# Patient Record
Sex: FEMALE | Race: WHITE | NOT HISPANIC OR LATINO | Employment: OTHER | URBAN - METROPOLITAN AREA
[De-identification: names, ages, dates, MRNs, and addresses within clinical notes are randomized per-mention and may not be internally consistent; named-entity substitution may affect disease eponyms.]

---

## 2017-02-08 ENCOUNTER — OFFICE VISIT - HEALTHEAST (OUTPATIENT)
Dept: FAMILY MEDICINE | Facility: CLINIC | Age: 57
End: 2017-02-08

## 2017-02-08 DIAGNOSIS — Z85.048 HISTORY OF RECTAL OR ANAL CANCER: ICD-10-CM

## 2017-02-08 DIAGNOSIS — E78.5 HYPERLIPIDEMIA: ICD-10-CM

## 2017-02-08 DIAGNOSIS — I10 ESSENTIAL HYPERTENSION: ICD-10-CM

## 2017-02-08 LAB
CHOLEST SERPL-MCNC: 219 MG/DL
FASTING STATUS PATIENT QL REPORTED: YES
HDLC SERPL-MCNC: 41 MG/DL
LDLC SERPL CALC-MCNC: 143 MG/DL
TRIGL SERPL-MCNC: 175 MG/DL

## 2017-02-08 ASSESSMENT — MIFFLIN-ST. JEOR: SCORE: 1858.72

## 2017-02-09 ENCOUNTER — COMMUNICATION - HEALTHEAST (OUTPATIENT)
Dept: FAMILY MEDICINE | Facility: CLINIC | Age: 57
End: 2017-02-09

## 2017-08-10 ENCOUNTER — OFFICE VISIT - HEALTHEAST (OUTPATIENT)
Dept: FAMILY MEDICINE | Facility: CLINIC | Age: 57
End: 2017-08-10

## 2017-08-10 DIAGNOSIS — B35.1 FUNGAL NAIL INFECTION: ICD-10-CM

## 2017-08-10 DIAGNOSIS — E78.5 HYPERLIPIDEMIA: ICD-10-CM

## 2017-08-10 DIAGNOSIS — I10 ESSENTIAL HYPERTENSION: ICD-10-CM

## 2017-08-10 DIAGNOSIS — Z23 IMMUNIZATION DUE: ICD-10-CM

## 2017-08-10 DIAGNOSIS — Z12.4 SCREENING FOR CERVICAL CANCER: ICD-10-CM

## 2017-08-10 LAB
CHOLEST SERPL-MCNC: 202 MG/DL
FASTING STATUS PATIENT QL REPORTED: YES
HDLC SERPL-MCNC: 40 MG/DL
LDLC SERPL CALC-MCNC: 133 MG/DL
TRIGL SERPL-MCNC: 146 MG/DL

## 2017-08-10 ASSESSMENT — MIFFLIN-ST. JEOR: SCORE: 1796.08

## 2017-08-11 ENCOUNTER — COMMUNICATION - HEALTHEAST (OUTPATIENT)
Dept: FAMILY MEDICINE | Facility: CLINIC | Age: 57
End: 2017-08-11

## 2017-08-15 LAB
HPV INTERPRETATION - HISTORICAL: NORMAL
HPV INTERPRETER - HISTORICAL: NORMAL

## 2017-09-13 ENCOUNTER — HOSPITAL ENCOUNTER (OUTPATIENT)
Dept: MAMMOGRAPHY | Facility: CLINIC | Age: 57
Discharge: HOME | End: 2017-09-13
Attending: FAMILY MEDICINE

## 2017-09-13 DIAGNOSIS — Z12.31 VISIT FOR SCREENING MAMMOGRAM: ICD-10-CM

## 2017-09-14 ENCOUNTER — COMMUNICATION - HEALTHEAST (OUTPATIENT)
Dept: FAMILY MEDICINE | Facility: CLINIC | Age: 57
End: 2017-09-14

## 2018-02-14 ENCOUNTER — OFFICE VISIT - HEALTHEAST (OUTPATIENT)
Dept: FAMILY MEDICINE | Facility: CLINIC | Age: 58
End: 2018-02-14

## 2018-02-14 DIAGNOSIS — I10 ESSENTIAL HYPERTENSION: ICD-10-CM

## 2018-02-14 DIAGNOSIS — I10 HYPERTENSION: ICD-10-CM

## 2018-02-14 DIAGNOSIS — Z13.1 SCREENING FOR DIABETES MELLITUS: ICD-10-CM

## 2018-02-14 DIAGNOSIS — E78.5 HYPERLIPIDEMIA: ICD-10-CM

## 2018-02-14 LAB
CHOLEST SERPL-MCNC: 217 MG/DL
FASTING STATUS PATIENT QL REPORTED: YES
HDLC SERPL-MCNC: 45 MG/DL
LDLC SERPL CALC-MCNC: 142 MG/DL
TRIGL SERPL-MCNC: 150 MG/DL

## 2018-02-14 ASSESSMENT — MIFFLIN-ST. JEOR: SCORE: 1854.48

## 2018-02-15 LAB — HBA1C MFR BLD: 5.7 % (ref 4.2–6.1)

## 2018-02-16 ENCOUNTER — COMMUNICATION - HEALTHEAST (OUTPATIENT)
Dept: FAMILY MEDICINE | Facility: CLINIC | Age: 58
End: 2018-02-16

## 2018-03-15 ENCOUNTER — OFFICE VISIT - HEALTHEAST (OUTPATIENT)
Dept: FAMILY MEDICINE | Facility: CLINIC | Age: 58
End: 2018-03-15

## 2018-03-15 DIAGNOSIS — I10 ESSENTIAL HYPERTENSION: ICD-10-CM

## 2018-03-15 ASSESSMENT — MIFFLIN-ST. JEOR: SCORE: 1855.05

## 2018-03-22 ENCOUNTER — COMMUNICATION - HEALTHEAST (OUTPATIENT)
Dept: FAMILY MEDICINE | Facility: CLINIC | Age: 58
End: 2018-03-22

## 2018-03-22 DIAGNOSIS — I10 ESSENTIAL HYPERTENSION: ICD-10-CM

## 2018-03-26 ENCOUNTER — COMMUNICATION - HEALTHEAST (OUTPATIENT)
Dept: FAMILY MEDICINE | Facility: CLINIC | Age: 58
End: 2018-03-26

## 2018-03-26 DIAGNOSIS — I10 ESSENTIAL HYPERTENSION: ICD-10-CM

## 2018-05-31 ENCOUNTER — OFFICE VISIT - HEALTHEAST (OUTPATIENT)
Dept: FAMILY MEDICINE | Facility: CLINIC | Age: 58
End: 2018-05-31

## 2018-05-31 ENCOUNTER — COMMUNICATION - HEALTHEAST (OUTPATIENT)
Dept: FAMILY MEDICINE | Facility: CLINIC | Age: 58
End: 2018-05-31

## 2018-05-31 DIAGNOSIS — I10 HYPERTENSION: ICD-10-CM

## 2018-05-31 DIAGNOSIS — E78.5 HYPERLIPIDEMIA: ICD-10-CM

## 2018-05-31 LAB
ALBUMIN SERPL-MCNC: 3.7 G/DL (ref 3.5–5)
ALP SERPL-CCNC: 88 U/L (ref 45–120)
ALT SERPL W P-5'-P-CCNC: 12 U/L (ref 0–45)
ANION GAP SERPL CALCULATED.3IONS-SCNC: 10 MMOL/L (ref 5–18)
AST SERPL W P-5'-P-CCNC: 17 U/L (ref 0–40)
BILIRUB SERPL-MCNC: 0.6 MG/DL (ref 0–1)
BUN SERPL-MCNC: 16 MG/DL (ref 8–22)
CALCIUM SERPL-MCNC: 9.5 MG/DL (ref 8.5–10.5)
CHLORIDE BLD-SCNC: 108 MMOL/L (ref 98–107)
CHOLEST SERPL-MCNC: 199 MG/DL
CO2 SERPL-SCNC: 23 MMOL/L (ref 22–31)
CREAT SERPL-MCNC: 0.83 MG/DL (ref 0.6–1.1)
FASTING STATUS PATIENT QL REPORTED: YES
GFR SERPL CREATININE-BSD FRML MDRD: >60 ML/MIN/1.73M2
GLUCOSE BLD-MCNC: 89 MG/DL (ref 70–125)
HDLC SERPL-MCNC: 42 MG/DL
LDLC SERPL CALC-MCNC: 127 MG/DL
POTASSIUM BLD-SCNC: 4.5 MMOL/L (ref 3.5–5)
PROT SERPL-MCNC: 7 G/DL (ref 6–8)
SODIUM SERPL-SCNC: 141 MMOL/L (ref 136–145)
TRIGL SERPL-MCNC: 151 MG/DL

## 2018-05-31 ASSESSMENT — MIFFLIN-ST. JEOR: SCORE: 1827.26

## 2018-09-24 ENCOUNTER — COMMUNICATION - HEALTHEAST (OUTPATIENT)
Dept: FAMILY MEDICINE | Facility: CLINIC | Age: 58
End: 2018-09-24

## 2018-09-24 DIAGNOSIS — I10 ESSENTIAL HYPERTENSION: ICD-10-CM

## 2018-09-25 ENCOUNTER — HOSPITAL ENCOUNTER (OUTPATIENT)
Dept: MAMMOGRAPHY | Facility: CLINIC | Age: 58
Discharge: HOME | End: 2018-09-25
Attending: FAMILY MEDICINE

## 2018-09-25 DIAGNOSIS — Z12.31 VISIT FOR SCREENING MAMMOGRAM: ICD-10-CM

## 2019-04-15 ENCOUNTER — COMMUNICATION - HEALTHEAST (OUTPATIENT)
Dept: FAMILY MEDICINE | Facility: CLINIC | Age: 59
End: 2019-04-15

## 2019-05-14 ENCOUNTER — COMMUNICATION - HEALTHEAST (OUTPATIENT)
Dept: FAMILY MEDICINE | Facility: CLINIC | Age: 59
End: 2019-05-14

## 2019-05-14 ENCOUNTER — OFFICE VISIT - HEALTHEAST (OUTPATIENT)
Dept: FAMILY MEDICINE | Facility: CLINIC | Age: 59
End: 2019-05-14

## 2019-05-14 DIAGNOSIS — Z23 IMMUNIZATION DUE: ICD-10-CM

## 2019-05-14 DIAGNOSIS — R45.89 FEELING OF SADNESS: ICD-10-CM

## 2019-05-14 DIAGNOSIS — E78.5 HYPERLIPIDEMIA: ICD-10-CM

## 2019-05-14 DIAGNOSIS — I10 HYPERTENSION: ICD-10-CM

## 2019-05-14 LAB
ALBUMIN SERPL-MCNC: 3.7 G/DL (ref 3.5–5)
ALP SERPL-CCNC: 94 U/L (ref 45–120)
ALT SERPL W P-5'-P-CCNC: 16 U/L (ref 0–45)
ANION GAP SERPL CALCULATED.3IONS-SCNC: 13 MMOL/L (ref 5–18)
AST SERPL W P-5'-P-CCNC: 18 U/L (ref 0–40)
BILIRUB SERPL-MCNC: 0.4 MG/DL (ref 0–1)
BUN SERPL-MCNC: 17 MG/DL (ref 8–22)
CALCIUM SERPL-MCNC: 9.2 MG/DL (ref 8.5–10.5)
CHLORIDE BLD-SCNC: 107 MMOL/L (ref 98–107)
CHOLEST SERPL-MCNC: 209 MG/DL
CO2 SERPL-SCNC: 21 MMOL/L (ref 22–31)
CREAT SERPL-MCNC: 0.86 MG/DL (ref 0.6–1.1)
FASTING STATUS PATIENT QL REPORTED: YES
GFR SERPL CREATININE-BSD FRML MDRD: >60 ML/MIN/1.73M2
GLUCOSE BLD-MCNC: 87 MG/DL (ref 70–125)
HDLC SERPL-MCNC: 43 MG/DL
LDLC SERPL CALC-MCNC: 131 MG/DL
POTASSIUM BLD-SCNC: 4.2 MMOL/L (ref 3.5–5)
PROT SERPL-MCNC: 7 G/DL (ref 6–8)
SODIUM SERPL-SCNC: 141 MMOL/L (ref 136–145)
TRIGL SERPL-MCNC: 174 MG/DL

## 2019-05-14 ASSESSMENT — MIFFLIN-ST. JEOR: SCORE: 1894.74

## 2019-10-03 ENCOUNTER — OFFICE VISIT - HEALTHEAST (OUTPATIENT)
Dept: FAMILY MEDICINE | Facility: CLINIC | Age: 59
End: 2019-10-03

## 2019-10-03 DIAGNOSIS — I10 ESSENTIAL HYPERTENSION: ICD-10-CM

## 2019-10-03 DIAGNOSIS — E78.2 MIXED HYPERLIPIDEMIA: ICD-10-CM

## 2019-10-03 DIAGNOSIS — Z23 NEED FOR INFLUENZA VACCINATION: ICD-10-CM

## 2019-10-03 LAB
CHOLEST SERPL-MCNC: 195 MG/DL
FASTING STATUS PATIENT QL REPORTED: YES
HDLC SERPL-MCNC: 44 MG/DL
LDLC SERPL CALC-MCNC: 121 MG/DL
TRIGL SERPL-MCNC: 148 MG/DL

## 2019-10-03 ASSESSMENT — MIFFLIN-ST. JEOR: SCORE: 1889.92

## 2019-10-04 ENCOUNTER — COMMUNICATION - HEALTHEAST (OUTPATIENT)
Dept: FAMILY MEDICINE | Facility: CLINIC | Age: 59
End: 2019-10-04

## 2019-10-07 ENCOUNTER — HOSPITAL ENCOUNTER (OUTPATIENT)
Dept: MAMMOGRAPHY | Facility: CLINIC | Age: 59
Discharge: HOME | End: 2019-10-07
Attending: FAMILY MEDICINE

## 2019-10-07 DIAGNOSIS — Z12.31 VISIT FOR SCREENING MAMMOGRAM: ICD-10-CM

## 2020-09-11 ENCOUNTER — COMMUNICATION - HEALTHEAST (OUTPATIENT)
Dept: FAMILY MEDICINE | Facility: CLINIC | Age: 60
End: 2020-09-11

## 2020-09-11 DIAGNOSIS — I10 ESSENTIAL HYPERTENSION: ICD-10-CM

## 2020-10-12 ENCOUNTER — HOSPITAL ENCOUNTER (OUTPATIENT)
Dept: MAMMOGRAPHY | Facility: CLINIC | Age: 60
Discharge: HOME | End: 2020-10-12
Attending: FAMILY MEDICINE

## 2020-10-12 DIAGNOSIS — Z12.31 VISIT FOR SCREENING MAMMOGRAM: ICD-10-CM

## 2020-10-13 ENCOUNTER — COMMUNICATION - HEALTHEAST (OUTPATIENT)
Dept: FAMILY MEDICINE | Facility: CLINIC | Age: 60
End: 2020-10-13

## 2020-12-11 ENCOUNTER — COMMUNICATION - HEALTHEAST (OUTPATIENT)
Dept: FAMILY MEDICINE | Facility: CLINIC | Age: 60
End: 2020-12-11

## 2020-12-11 DIAGNOSIS — I10 ESSENTIAL HYPERTENSION: ICD-10-CM

## 2020-12-14 RX ORDER — LISINOPRIL 2.5 MG/1
TABLET ORAL
Qty: 180 TABLET | Refills: 0 | Status: SHIPPED | OUTPATIENT
Start: 2020-12-14 | End: 2021-08-12

## 2021-03-16 ENCOUNTER — OFFICE VISIT - HEALTHEAST (OUTPATIENT)
Dept: FAMILY MEDICINE | Facility: CLINIC | Age: 61
End: 2021-03-16

## 2021-03-16 DIAGNOSIS — Z13.220 SCREENING FOR LIPID DISORDERS: ICD-10-CM

## 2021-03-16 DIAGNOSIS — E66.01 MORBID OBESITY (H): ICD-10-CM

## 2021-03-16 DIAGNOSIS — I10 ESSENTIAL HYPERTENSION: ICD-10-CM

## 2021-03-16 DIAGNOSIS — Z13.1 SCREENING FOR DIABETES MELLITUS: ICD-10-CM

## 2021-03-16 LAB
ALBUMIN SERPL-MCNC: 3.7 G/DL (ref 3.5–5)
ALP SERPL-CCNC: 95 U/L (ref 45–120)
ALT SERPL W P-5'-P-CCNC: 18 U/L (ref 0–45)
ANION GAP SERPL CALCULATED.3IONS-SCNC: 9 MMOL/L (ref 5–18)
AST SERPL W P-5'-P-CCNC: 23 U/L (ref 0–40)
BILIRUB SERPL-MCNC: 0.5 MG/DL (ref 0–1)
BUN SERPL-MCNC: 15 MG/DL (ref 8–22)
CALCIUM SERPL-MCNC: 8.6 MG/DL (ref 8.5–10.5)
CHLORIDE BLD-SCNC: 108 MMOL/L (ref 98–107)
CHOLEST SERPL-MCNC: 202 MG/DL
CO2 SERPL-SCNC: 26 MMOL/L (ref 22–31)
CREAT SERPL-MCNC: 0.87 MG/DL (ref 0.6–1.1)
FASTING STATUS PATIENT QL REPORTED: YES
GFR SERPL CREATININE-BSD FRML MDRD: >60 ML/MIN/1.73M2
GLUCOSE BLD-MCNC: 97 MG/DL (ref 70–125)
HBA1C MFR BLD: 5.5 %
HDLC SERPL-MCNC: 46 MG/DL
LDLC SERPL CALC-MCNC: 128 MG/DL
POTASSIUM BLD-SCNC: 4.3 MMOL/L (ref 3.5–5)
PROT SERPL-MCNC: 6.9 G/DL (ref 6–8)
SODIUM SERPL-SCNC: 143 MMOL/L (ref 136–145)
TRIGL SERPL-MCNC: 141 MG/DL

## 2021-03-16 RX ORDER — LISINOPRIL 10 MG/1
10 TABLET ORAL DAILY
Qty: 90 TABLET | Refills: 1 | Status: SHIPPED | OUTPATIENT
Start: 2021-03-16 | End: 2021-08-12

## 2021-03-16 ASSESSMENT — MIFFLIN-ST. JEOR: SCORE: 1927.06

## 2021-03-19 ENCOUNTER — COMMUNICATION - HEALTHEAST (OUTPATIENT)
Dept: FAMILY MEDICINE | Facility: CLINIC | Age: 61
End: 2021-03-19

## 2021-05-24 ENCOUNTER — RECORDS - HEALTHEAST (OUTPATIENT)
Dept: ADMINISTRATIVE | Facility: CLINIC | Age: 61
End: 2021-05-24

## 2021-05-25 ENCOUNTER — RECORDS - HEALTHEAST (OUTPATIENT)
Dept: ADMINISTRATIVE | Facility: CLINIC | Age: 61
End: 2021-05-25

## 2021-05-26 ENCOUNTER — RECORDS - HEALTHEAST (OUTPATIENT)
Dept: ADMINISTRATIVE | Facility: CLINIC | Age: 61
End: 2021-05-26

## 2021-05-27 ENCOUNTER — RECORDS - HEALTHEAST (OUTPATIENT)
Dept: ADMINISTRATIVE | Facility: CLINIC | Age: 61
End: 2021-05-27

## 2021-05-28 ENCOUNTER — RECORDS - HEALTHEAST (OUTPATIENT)
Dept: ADMINISTRATIVE | Facility: CLINIC | Age: 61
End: 2021-05-28

## 2021-05-28 NOTE — PROGRESS NOTES
ASSESMENT AND PLAN:  Diagnoses and all orders for this visit:    Hypertension  -     Comprehensive Metabolic Panel  -     lisinopril (PRINIVIL,ZESTRIL) 5 MG tablet; TAKE 1 TABLET BY MOUTH DAILY ALONG WITH 10 MG PILL FOR TOTAL DAILY DOSE OF 15 MG  Dispense: 90 tablet; Refill: 2  -     lisinopril (PRINIVIL,ZESTRIL) 10 MG tablet; Take 1 tablet (10 mg total) by mouth daily.  Dispense: 90 tablet; Refill: 2  Still not at target. Pt does not want to increase current med dose. Will try to cut back salt, more regular physical activities.  Recheck in a few weeks.    Hyperlipidemia  -     Lipid Profile  Patient does not want to be on meds.  Last lipid 1 year ago.  To recheck today.  Discussed low-cholesterol diet and regular physical activities.    Feeling of sadness  Offered referral for counseling, pt declined.     Immunization due  -     Pneumococcal conjugate vaccine 13-valent 6wks-17yrs; >50yrs        SUBJECTIVE: Tanisha ENGEL Leonora is here for follow up.    HTN- On lisinopril 15 mg daily. No SE reported. States home BP reading are in 120s-130s/70-80s. Forgot to bring BP log/home BP cuff. No physical complaints. But brother passed away in 9/18. Reports family conflicts after brother's death. Feeling sad and overwhelmed.  No suicidal or homicidal ideations.     Lipid- Last lipid in 5/18. Not on med. Does not want to be on med. Trying to eat healthy but hast not been able to exercise.         No past medical history on file.  Patient Active Problem List   Diagnosis     Tick bite of back     Screening for lipid disorders     Elevated blood pressure     Screening for diabetes mellitus     Hypertension     History of rectal or anal cancer       Allergies:  No Known Allergies    Social History     Tobacco Use   Smoking Status Never Smoker   Smokeless Tobacco Never Used       Review of systems otherwise negative except as listed in HPI.   Social History     Tobacco Use   Smoking Status Never Smoker   Smokeless Tobacco Never Used  "      OBJECTICE: /88   Pulse 84   Temp 97.6  F (36.4  C) (Oral)   Resp 20   Ht 5' 7\" (1.702 m)   Wt (!) 287 lb 1 oz (130.2 kg)   BMI 44.96 kg/m      DATA REVIEWED:    Labs Reviewed or Ordered (1):       GEN-alert,  in no apparent distress.  HEENT-mucous membranes are moist, neck is supple.  CV-regular rate and rhythm with no murmur.   RESP-lungs clear to auscultation .  ABDOMEN- Soft , not tender.  EXTREM- No edema.  NEURO- Normal.   SKIN-normal        Bryce Za   5/14/2019   "

## 2021-05-29 ENCOUNTER — RECORDS - HEALTHEAST (OUTPATIENT)
Dept: ADMINISTRATIVE | Facility: CLINIC | Age: 61
End: 2021-05-29

## 2021-05-30 ENCOUNTER — RECORDS - HEALTHEAST (OUTPATIENT)
Dept: ADMINISTRATIVE | Facility: CLINIC | Age: 61
End: 2021-05-30

## 2021-05-30 VITALS — BODY MASS INDEX: 43.67 KG/M2 | WEIGHT: 278.25 LBS | HEIGHT: 67 IN

## 2021-05-31 VITALS — HEIGHT: 67 IN | BODY MASS INDEX: 41.64 KG/M2 | WEIGHT: 265.31 LBS

## 2021-06-01 VITALS — BODY MASS INDEX: 43.68 KG/M2 | WEIGHT: 278.31 LBS | HEIGHT: 67 IN

## 2021-06-01 VITALS — WEIGHT: 272.19 LBS | HEIGHT: 67 IN | BODY MASS INDEX: 42.72 KG/M2

## 2021-06-01 VITALS — HEIGHT: 67 IN | WEIGHT: 278.19 LBS | BODY MASS INDEX: 43.66 KG/M2

## 2021-06-02 NOTE — PROGRESS NOTES
"ASSESMENT AND PLAN:  Diagnoses and all orders for this visit:    Mixed hyperlipidemia  -     Lipid Cascade FASTING  Start medication if indicated.    Essential hypertension  -     lisinopril (PRINIVIL,ZESTRIL) 5 MG tablet; TAKE 1 TABLET BY MOUTH DAILY ALONG WITH 10 MG PILL FOR TOTAL DAILY DOSE OF 15 MG  Dispense: 90 tablet; Refill: 2  -     lisinopril (PRINIVIL,ZESTRIL) 10 MG tablet; Take 1 tablet (10 mg total) by mouth daily.  Dispense: 90 tablet; Refill: 2  She will continue lisinopril 15 mg daily.    Need for influenza vaccination  -     Influenza, Seasonal Quad, PF =/> 6months      This transcription uses voice recognition software, which may contain typographical errors.        SUBJECTIVE: Tanisha Lea is a 58-year-old female with history of hypertension here for follow-up.  She is currently on lisinopril 15 mg daily.  She checks her blood pressure at home to 3 times a week.  States it has been in the 120s to low 70s.  Denied acute headache.  Denied acute vision changes or weaknesses.    Her cholesterol was slightly elevated, but it is not taking any medication.      No new concerns or complaints since last visit.    No past medical history on file.  Patient Active Problem List   Diagnosis     Tick bite of back     Screening for lipid disorders     Elevated blood pressure     Screening for diabetes mellitus     Hypertension     History of rectal or anal cancer     Hyperlipidemia       Allergies:  No Known Allergies    Social History     Tobacco Use   Smoking Status Never Smoker   Smokeless Tobacco Never Used       Review of systems otherwise negative except as listed in HPI.   Social History     Tobacco Use   Smoking Status Never Smoker   Smokeless Tobacco Never Used       OBJECTICE: /88 (Patient Site: Left Arm, Patient Position: Sitting, Cuff Size: Adult Large)   Pulse (!) 53   Temp 97.8  F (36.6  C) (Oral)   Ht 5' 7\" (1.702 m)   Wt (!) 286 lb (129.7 kg)   SpO2 92%   BMI 44.79 kg/m      DATA " REVIEWED:    Labs Reviewed or Ordered (1):       GEN-alert,  in no apparent distress.  HEENT- neck is supple.  CV-regular rate and rhythm with no murmur.   RESP-lungs clear to auscultation .  ABDOMEN- Soft , not tender.  EXTREM- No edema.   SKIN-normal        Ramón Kruger   10/3/2019

## 2021-06-03 VITALS — BODY MASS INDEX: 45.06 KG/M2 | WEIGHT: 287.06 LBS | HEIGHT: 67 IN

## 2021-06-03 VITALS
DIASTOLIC BLOOD PRESSURE: 88 MMHG | HEART RATE: 53 BPM | TEMPERATURE: 97.8 F | BODY MASS INDEX: 44.89 KG/M2 | OXYGEN SATURATION: 92 % | SYSTOLIC BLOOD PRESSURE: 122 MMHG | HEIGHT: 67 IN | WEIGHT: 286 LBS

## 2021-06-05 VITALS
HEART RATE: 82 BPM | WEIGHT: 293 LBS | DIASTOLIC BLOOD PRESSURE: 84 MMHG | RESPIRATION RATE: 20 BRPM | OXYGEN SATURATION: 95 % | TEMPERATURE: 98 F | BODY MASS INDEX: 45.99 KG/M2 | SYSTOLIC BLOOD PRESSURE: 132 MMHG | HEIGHT: 67 IN

## 2021-06-08 NOTE — PROGRESS NOTES
"ASSESMENT AND PLAN:  Diagnoses and all orders for this visit:    Essential hypertension  -     Basic Metabolic Panel  -     lisinopril (PRINIVIL,ZESTRIL) 10 MG tablet; Take 1 tablet (10 mg total) by mouth daily.  Dispense: 30 tablet; Refill: 5  Pt will check BP at home daily for the next 2 weeks. She will call if consistently above 140/90.  Will increase lisinopril dose if needed.    Hyperlipidemia  -     Lipid Cascade  Start med if needed.    History of rectal or anal cancer  Pt brought in last colonoscopy report done in 2014.  1 mm polyps in transverse colon. Recommended repeat colonoscopy in 5 years. Due in 2019.    SUBJECTIVE: Tanisha Lea is here for HTN follow up.  Take lisinopril 10 mg daily, no SE reported.  Checks BP once or twice a week, 110s-120s/70s . Never above 130/90 at home. No Chest pain, acute vision changes or headaches.  No new complaints today.   Has been trying to eat healthy and exercise regularly.       No past medical history on file.  Patient Active Problem List   Diagnosis     Tick bite of back     Screening for lipid disorders     Elevated blood pressure     Screening for diabetes mellitus     Hypertension     History of rectal or anal cancer       Allergies:  No Known Allergies    History   Smoking Status     Never Smoker   Smokeless Tobacco     Never Used       Review of systems otherwise negative except as listed in HPI.   History   Smoking Status     Never Smoker   Smokeless Tobacco     Never Used       OBJECTICE:   Visit Vitals     /88     Pulse 87     Temp 98  F (36.7  C) (Oral)     Resp 20     Ht 5' 7.25\" (1.708 m)     Wt (!) 278 lb 4 oz (126.2 kg)     SpO2 96%     BMI 43.26 kg/m2           GEN-alert,  in no apparent distress.  HEENT-mucous membranes are moist, neck is supple.  CV-regular rate and rhythm with no murmur.   RESP-lungs clear to auscultation .  ABDOMEN- Soft , not tender.  EXTREM- No edema.  SKIN-normal        [facility]   2/8/2017     "

## 2021-06-11 NOTE — TELEPHONE ENCOUNTER
One refill sent. Due for office visit. Please call the patient.    Dr. Ramón Kruger  9/15/2020 7:49 AM

## 2021-06-11 NOTE — TELEPHONE ENCOUNTER
RN cannot approve Refill Request    RN can NOT refill this medication PCP messaged that patient is overdue for Labs. Last office visit: 10/3/2019 Ramón Kruger MD Last Physical: Visit date not found Last MTM visit: Visit date not found Last visit same specialty: 10/3/2019 Ramón Kruger MD.  Next visit within 3 mo: Visit date not found  Next physical within 3 mo: Visit date not found      Robina Kraft, [facility] Triage/Med Refill 9/12/2020    Requested Prescriptions   Pending Prescriptions Disp Refills     lisinopriL (PRINIVIL,ZESTRIL) 10 MG tablet [Pharmacy Med Name: LISINOPRIL 10MG TABLETS] 90 tablet 2     Sig: TAKE 1 TABLET(10 MG) BY MOUTH DAILY       Ace Inhibitors Refill Protocol Failed - 9/11/2020  2:49 PM        Failed - Serum Potassium in past 12 months     No results found for: LN-POTASSIUM          Failed - Serum Creatinine in past 12 months     Creatinine   Date Value Ref Range Status   05/14/2019 0.86 0.60 - 1.10 mg/dL Final             Passed - PCP or prescribing provider visit in past 12 months       Last office visit with prescriber/PCP: 10/3/2019 Ramón Kruger MD OR same dept: 10/3/2019 Ramón Kruger MD OR same specialty: 10/3/2019 Ramón Kruger MD  Last physical: Visit date not found Last MTM visit: Visit date not found   Next visit within 3 mo: Visit date not found  Next physical within 3 mo: Visit date not found  Prescriber OR PCP: Ramón Kruger MD  Last diagnosis associated with med order: 1. Essential hypertension  - lisinopriL (PRINIVIL,ZESTRIL) 10 MG tablet [Pharmacy Med Name: LISINOPRIL 10MG TABLETS]; TAKE 1 TABLET(10 MG) BY MOUTH DAILY  Dispense: 90 tablet; Refill: 2  - lisinopriL (PRINIVIL,ZESTRIL) 5 MG tablet [Pharmacy Med Name: LISINOPRIL 5MG TABLETS]; TAKE 1 TABLET BY MOUTH DAILY ALONG WITH 10MG TABLET FOR A TOTAL DAILY DOSE OF 15MG.  Dispense: 90 tablet; Refill: 2    If protocol passes may refill for 12 months if within 3 months of last provider visit (or a total of 15 months).             Passed  - Blood pressure filed in past 12 months     BP Readings from Last 1 Encounters:   10/03/19 122/88                lisinopriL (PRINIVIL,ZESTRIL) 5 MG tablet [Pharmacy Med Name: LISINOPRIL 5MG TABLETS] 90 tablet 2     Sig: TAKE 1 TABLET BY MOUTH DAILY ALONG WITH 10MG TABLET FOR A TOTAL DAILY DOSE OF 15MG.       Ace Inhibitors Refill Protocol Failed - 9/11/2020  2:49 PM        Failed - Serum Potassium in past 12 months     No results found for: LN-POTASSIUM          Failed - Serum Creatinine in past 12 months     Creatinine   Date Value Ref Range Status   05/14/2019 0.86 0.60 - 1.10 mg/dL Final             Passed - PCP or prescribing provider visit in past 12 months       Last office visit with prescriber/PCP: 10/3/2019 Ramón Kruger MD OR same dept: 10/3/2019 Ramón Kruger MD OR same specialty: 10/3/2019 Ramón Kruger MD  Last physical: Visit date not found Last MTM visit: Visit date not found   Next visit within 3 mo: Visit date not found  Next physical within 3 mo: Visit date not found  Prescriber OR PCP: Ramón Kruger MD  Last diagnosis associated with med order: 1. Essential hypertension  - lisinopriL (PRINIVIL,ZESTRIL) 10 MG tablet [Pharmacy Med Name: LISINOPRIL 10MG TABLETS]; TAKE 1 TABLET(10 MG) BY MOUTH DAILY  Dispense: 90 tablet; Refill: 2  - lisinopriL (PRINIVIL,ZESTRIL) 5 MG tablet [Pharmacy Med Name: LISINOPRIL 5MG TABLETS]; TAKE 1 TABLET BY MOUTH DAILY ALONG WITH 10MG TABLET FOR A TOTAL DAILY DOSE OF 15MG.  Dispense: 90 tablet; Refill: 2    If protocol passes may refill for 12 months if within 3 months of last provider visit (or a total of 15 months).             Passed - Blood pressure filed in past 12 months     BP Readings from Last 1 Encounters:   10/03/19 122/88

## 2021-06-12 NOTE — PROGRESS NOTES
ASSESMENT AND PLAN:  Diagnoses and all orders for this visit:    Essential hypertension  -     Basic Metabolic Panel  -     lisinopril (PRINIVIL,ZESTRIL) 10 MG tablet; Take 1 tablet (10 mg total) by mouth daily.  Dispense: 30 tablet; Refill: 5  Continue current medications, follow-up in 6 months.    Hyperlipidemia  -     Lipid Cascade  Reviewed previous lipid results.  Discussed medication if worsen, patient agreed.    Screening for cervical cancer  -     Gynecologic Cytology (PAP Smear)    Fungal nail infection  Discussed options including oral, topical medications.  Discussed liver side effects from oral medication.  Discussed possible long duration of treatment.  Patient elected to try OTC topical medications first. Deferred confirmation test at this time.     Immunization due  -     Tdap vaccine,  6yo or older,  IM        SUBJECTIVE: Tanisha MAREN Lea is here to f/u on HTN and pap smear.  On lisinopril 10 mg daily for HTN, home blood pressure ranges 110-120s/80s .  No medication side effects reported.    Last Pap was more than 10 years ago.  Sexually active.  No known history of abnormal Pap in the past. No vaginal bleeding or discharge.    Elevated lipids, has been trying diet and exercise, needs recheck today.    Concern about fungus infection on the left big toe, started a few months ago.  No pain.    No cute fever or URI symptoms.  No CP, SOB or weakness.   No headache.    No past medical history on file.  Patient Active Problem List   Diagnosis     Tick bite of back     Screening for lipid disorders     Elevated blood pressure     Screening for diabetes mellitus     Hypertension     History of rectal or anal cancer       Allergies:  No Known Allergies    History   Smoking Status     Never Smoker   Smokeless Tobacco     Never Used       Review of systems otherwise negative except as listed in HPI.   History   Smoking Status     Never Smoker   Smokeless Tobacco     Never Used       OBJECTICE: /84 (Patient  "Site: Left Arm, Patient Position: Sitting, Cuff Size: Adult Large)  Pulse 84  Temp 97.9  F (36.6  C) (Oral)   Ht 5' 7\" (1.702 m)  Wt (!) 265 lb 5 oz (120.3 kg)  BMI 41.55 kg/m2    DATA REVIEWED:    Labs Reviewed or Ordered (1):       GEN-alert,  in no apparent distress.  HEENT-mucous membranes are moist, neck is supple.  CV-regular rate and rhythm with no murmur.   RESP-lungs clear to auscultation .  ABDOMEN- Soft , not tender.  PELVIC-normal external genitalia.  Vaginal wall appears normal.  Cervix normal.  Thin white discharge noted.  No cervical motion tenderness.  SKIN-normal        Ramón Kruger   8/10/2017     "

## 2021-06-13 NOTE — TELEPHONE ENCOUNTER
3 months refill sent. Due for office visit. Please call the patient.    Dr. Ramón Kruger  12/14/2020 1:54 PM

## 2021-06-13 NOTE — TELEPHONE ENCOUNTER
RN cannot approve Refill Request    RN can NOT refill this medication Protocol failed and NO refill given. Last office visit: 10/3/2019 Ramón Kruger MD Last Physical: Visit date not found Last MTM visit: Visit date not found Last visit same specialty: 10/3/2019 Ramón Kruger MD.  Next visit within 3 mo: Visit date not found  Next physical within 3 mo: Visit date not found      Robina Kraft, [facility] Triage/Med Refill 12/12/2020    Requested Prescriptions   Pending Prescriptions Disp Refills     lisinopriL (PRINIVIL,ZESTRIL) 2.5 MG tablet [Pharmacy Med Name: LISINOPRIL 2.5MG TABLETS] 180 tablet 0     Sig: TAKE 2 TABLETS(5MG) BY MOUTH DAILY ALONG WITH 10MG TABLET FOR A TOTAL DAILY DOSE OF 15MG.       Ace Inhibitors Refill Protocol Failed - 12/11/2020  6:29 PM        Failed - PCP or prescribing provider visit in past 12 months       Last office visit with prescriber/PCP: 10/3/2019 Ramón Kruger MD OR same dept: Visit date not found OR same specialty: 10/3/2019 Ramón Kruger MD  Last physical: Visit date not found Last MTM visit: Visit date not found   Next visit within 3 mo: Visit date not found  Next physical within 3 mo: Visit date not found  Prescriber OR PCP: Ramón Kruger MD  Last diagnosis associated with med order: 1. Essential hypertension  - lisinopriL (PRINIVIL,ZESTRIL) 2.5 MG tablet [Pharmacy Med Name: LISINOPRIL 2.5MG TABLETS]; TAKE 2 TABLETS(5MG) BY MOUTH DAILY ALONG WITH 10MG TABLET FOR A TOTAL DAILY DOSE OF 15MG.  Dispense: 180 tablet; Refill: 0  - lisinopriL (PRINIVIL,ZESTRIL) 10 MG tablet [Pharmacy Med Name: LISINOPRIL 10MG TABLETS]; TAKE 1 TABLET(10 MG) BY MOUTH DAILY  Dispense: 90 tablet; Refill: 0    If protocol passes may refill for 12 months if within 3 months of last provider visit (or a total of 15 months).             Failed - Serum Potassium in past 12 months     No results found for: LN-POTASSIUM          Failed - Blood pressure filed in past 12 months     BP Readings from Last 1 Encounters:    10/03/19 122/88             Failed - Serum Creatinine in past 12 months     Creatinine   Date Value Ref Range Status   05/14/2019 0.86 0.60 - 1.10 mg/dL Final                lisinopriL (PRINIVIL,ZESTRIL) 10 MG tablet [Pharmacy Med Name: LISINOPRIL 10MG TABLETS] 90 tablet 0     Sig: TAKE 1 TABLET(10 MG) BY MOUTH DAILY       Ace Inhibitors Refill Protocol Failed - 12/11/2020  6:29 PM        Failed - PCP or prescribing provider visit in past 12 months       Last office visit with prescriber/PCP: 10/3/2019 Ramón Kruger MD OR same dept: Visit date not found OR same specialty: 10/3/2019 Ramón Kruger MD  Last physical: Visit date not found Last MTM visit: Visit date not found   Next visit within 3 mo: Visit date not found  Next physical within 3 mo: Visit date not found  Prescriber OR PCP: Ramón Kruger MD  Last diagnosis associated with med order: 1. Essential hypertension  - lisinopriL (PRINIVIL,ZESTRIL) 2.5 MG tablet [Pharmacy Med Name: LISINOPRIL 2.5MG TABLETS]; TAKE 2 TABLETS(5MG) BY MOUTH DAILY ALONG WITH 10MG TABLET FOR A TOTAL DAILY DOSE OF 15MG.  Dispense: 180 tablet; Refill: 0  - lisinopriL (PRINIVIL,ZESTRIL) 10 MG tablet [Pharmacy Med Name: LISINOPRIL 10MG TABLETS]; TAKE 1 TABLET(10 MG) BY MOUTH DAILY  Dispense: 90 tablet; Refill: 0    If protocol passes may refill for 12 months if within 3 months of last provider visit (or a total of 15 months).             Failed - Serum Potassium in past 12 months     No results found for: LN-POTASSIUM          Failed - Blood pressure filed in past 12 months     BP Readings from Last 1 Encounters:   10/03/19 122/88             Failed - Serum Creatinine in past 12 months     Creatinine   Date Value Ref Range Status   05/14/2019 0.86 0.60 - 1.10 mg/dL Final

## 2021-06-15 NOTE — PROGRESS NOTES
ASSESMENT AND PLAN:  Diagnoses and all orders for this visit:    Essential hypertension  -     lisinopriL (PRINIVIL,ZESTRIL) 10 MG tablet; Take 1 tablet (10 mg total) by mouth daily.  Dispense: 90 tablet; Refill: 1  -     Comprehensive Metabolic Panel  She will continue to monitor her blood pressure at home.  She will let me know if consistently above 140/90.  We will continue lisinopril 10 mg for now.    Morbid obesity (H)  The following high BMI interventions were performed this visit: encouragement to exercise   Wt Readings from Last 3 Encounters:   03/16/21 (!) 294 lb 3 oz (133.4 kg)   10/03/19 (!) 286 lb (129.7 kg)   05/14/19 (!) 287 lb 1 oz (130.2 kg)     Screening for diabetes mellitus  -     Glycosylated Hemoglobin A1c    Screening for lipid disorders  -     Lipid Cascade RANDOM      This transcription uses voice recognition software, which may contain typographical errors.      SUBJECTIVE: Tanisha Lea is a 60-year-old female here to follow-up on hypertension.  She was last seen in October 2019.  Lisinopril was increased to 15 mg daily at that visit.  She monitor her blood pressure at home regularly.  She decreased her lisinopril to 12.5 mg daily in January due to low blood pressure reading diastolic in the 50s.  She continued to monitor blood pressure and cut her lisinopril back to 10 mg the beginning of this month.  His home blood pressure readings are in the low normal range mostly 110-100 systolic and 60s to 70s diastolic with lisinopril 10 mg.  She has no physical complaints.  But she does not like 1 her diastolic pressure is in the 50s.  She has not been able to exercise regularly.  She is planning on starting to exercise soon.    No past medical history on file.  Patient Active Problem List   Diagnosis     Tick bite of back     Screening for lipid disorders     Elevated blood pressure     Screening for diabetes mellitus     Hypertension     History of rectal or anal cancer     Hyperlipidemia      "Morbid obesity (H)       Allergies:  No Known Allergies    Social History     Tobacco Use   Smoking Status Never Smoker   Smokeless Tobacco Never Used       Review of systems otherwise negative except as listed in HPI.   Social History     Tobacco Use   Smoking Status Never Smoker   Smokeless Tobacco Never Used       OBJECTICE: /84   Pulse 82   Temp 98  F (36.7  C) (Oral)   Resp 20   Ht 5' 7\" (1.702 m)   Wt (!) 294 lb 3 oz (133.4 kg)   SpO2 95%   BMI 46.08 kg/m      DATA REVIEWED:    Labs Reviewed or Ordered (1):       GEN-alert,  in no apparent distress.  HEENT- neck is supple.  CV-regular rate and rhythm with no murmur.   RESP-lungs clear to auscultation .  EXTREM- No edema.  SKIN-normal        [place] Saskia   3/16/2021   "

## 2021-06-16 PROBLEM — E78.5 HYPERLIPIDEMIA: Status: ACTIVE | Noted: 2019-05-14

## 2021-06-16 PROBLEM — E66.01 MORBID OBESITY (H): Status: ACTIVE | Noted: 2021-03-16

## 2021-06-16 NOTE — PROGRESS NOTES
"ASSESMENT AND PLAN:  Diagnoses and all orders for this visit:    Hypertension  Uncontrolled.  Recheck blood pressure 148/82.  Reported normal home blood pressure reading, she did not bring her log today.  Discussed to increase lisinopril to a higher dose.  Patient would like to stay on current dose 10 mg daily and recheck in 1 month.  Will increase dose if indicated.    Screening for diabetes mellitus  -     Glycosylated Hemoglobin A1c    Hyperlipidemia  -     Lipid Cascade  Not on med.  , recheck. Start med if indicated.    Essential hypertension  -     lisinopril (PRINIVIL,ZESTRIL) 10 MG tablet; Take 1 tablet (10 mg total) by mouth daily.  Dispense: 30 tablet; Refill: 0        SUBJECTIVE: Tanisha Lea is here to follow-up on hypertension.  She takes lisinopril 10 mg daily.  No medication side effects reported.  She was last seen next months ago.  She has been checking her blood pressure at home and the numbers are 120s-130s/70.  No new symptoms since last visit.  No acute headaches, acute vision changes or one-sided weakness.  She cooks at home most days.  Tries to cut back on salt intake.  She has not been able to exercise regularly.  She gained 12 pounds since last visit.    No known family history of history of diabetes.  Brother has hypertension, on medication.    No past medical history on file.  Patient Active Problem List   Diagnosis     Tick bite of back     Screening for lipid disorders     Elevated blood pressure     Screening for diabetes mellitus     Hypertension     History of rectal or anal cancer       Allergies:  No Known Allergies    History   Smoking Status     Never Smoker   Smokeless Tobacco     Never Used       Review of systems otherwise negative except as listed in HPI.   History   Smoking Status     Never Smoker   Smokeless Tobacco     Never Used       OBJECTICE: /82  Pulse 88  Temp 97.8  F (36.6  C) (Oral)   Resp 20  Ht 5' 7\" (1.702 m)  Wt (!) 278 lb 3 oz (126.2 kg)  BMI " 43.57 kg/m2    DATA REVIEWED:    Labs Reviewed or Ordered (1): Lipid, A1c      GEN-alert,  in no apparent distress.  HEENT- neck is supple.  CV-regular rate and rhythm with no murmur.   RESP-lungs clear to auscultation .  ABDOMEN- Soft , not tender.  EXTREM- No edema.  SKIN-normal        [place] Saskia   2/14/2018

## 2021-06-16 NOTE — PROGRESS NOTES
"ASSESMENT AND PLAN:  Diagnoses and all orders for this visit:    Essential hypertension  -     lisinopril (PRINIVIL,ZESTRIL) 5 MG tablet; Take one tab daily along with 10 mg pill  , total 15 mg daily  Dispense: 30 tablet; Refill: 5  Rechecked blood pressure 3 times.  142/84  with manual,152/81 with home BP cuff.    150/90 with manual, 156/78 with home BP cuff    Will increase lisinopril to 15 mg daily.  Recheck blood pressure in 6 weeks.      SUBJECTIVE: Tanisha Lea is here for blood pressure check.  She is currently taking lisinopril 10 mg daily for her blood pressure.  She was seen 2 weeks ago and blood pressure was elevated, no med changes were made.  Today she states she is feeling good, no new problems or complaints since last visit.  No acute headache.  No acute vision changes.  No chest pain or shortness of breath.  She brought in her home blood pressure cuff today.    No past medical history on file.  Patient Active Problem List   Diagnosis     Tick bite of back     Screening for lipid disorders     Elevated blood pressure     Screening for diabetes mellitus     Hypertension     History of rectal or anal cancer       Allergies:  No Known Allergies    History   Smoking Status     Never Smoker   Smokeless Tobacco     Never Used       Review of systems otherwise negative except as listed in HPI.   History   Smoking Status     Never Smoker   Smokeless Tobacco     Never Used       OBJECTICE: /82  Pulse 76  Temp 97.6  F (36.4  C) (Oral)   Resp 20  Ht 5' 7\" (1.702 m)  Wt (!) 278 lb 5 oz (126.2 kg)  BMI 43.59 kg/m2          GEN-alert,  in no apparent distress.  HEENT- neck is supple.  CV-regular rate and rhythm with no murmur.   RESP-lungs clear to auscultation .    This transcription uses voice recognition software, which may contain typographical errors.          Ramón Kruger   3/15/2018     "

## 2021-06-18 NOTE — PROGRESS NOTES
"ASSESMENT AND PLAN:  Diagnoses and all orders for this visit:    Hypertension  -     Comprehensive Metabolic Panel  Nick better now, controlled.  Continue current med.   F/U in 6 months.       Hyperlipidemia  -     Lipid Cascade  Start med if indicated. Pt preferred life style changes first.   Last LDL was 142      SUBJECTIVE: Tanisha Lea is here for BP check.  Lisinopril was increased to 15 mg daily 2 months ago. Tolearting well, no SE reported.  She checks BP at home, log shows 100-110/70-80 daily.  No new problems since last visit.     No CP, shortness of breath, palpitations.  No fever or URI symptoms.     Helping brother who was diagnosed with brain cancer.       No past medical history on file.  Patient Active Problem List   Diagnosis     Tick bite of back     Screening for lipid disorders     Elevated blood pressure     Screening for diabetes mellitus     Hypertension     History of rectal or anal cancer       Allergies:  No Known Allergies    History   Smoking Status     Never Smoker   Smokeless Tobacco     Never Used       Review of systems otherwise negative except as listed in HPI.   History   Smoking Status     Never Smoker   Smokeless Tobacco     Never Used       OBJECTICE:   Vitals:    05/31/18 0917   BP: 138/88   Patient Site: Right Arm   Patient Position: Sitting   Cuff Size: Adult Large   Pulse: 72   Resp: 20   Temp: 97.8  F (36.6  C)   TempSrc: Oral   Weight: (!) 272 lb 3 oz (123.5 kg)   Height: 5' 7\" (1.702 m)     DATA REVIEWED:    Labs Reviewed or Ordered (1):       GEN-alert,  in no apparent distress.  HEENT-mucous membranes are moist, neck is supple.  CV-regular rate and rhythm with no murmur.   RESP-lungs clear to auscultation .  ABDOMEN- Soft , not tender.  EXTREM- No edema.  NEURO- Grossly normal.  SKIN-normal        Ramón Kruger   5/31/2018     "

## 2021-06-19 NOTE — LETTER
Letter by Ramón Kruger MD at      Author: Ramón Kruger MD Service: -- Author Type: --    Filed:  Encounter Date: 10/4/2019 Status: Signed         Tanisha MAREN Leonora  [address] [facility] [address]             October 4, 2019         Dear Ms. Lea,    Below are the results from your recent visit:    Resulted Orders   Lipid Hood FASTING   Result Value Ref Range    Cholesterol 195 <=199 mg/dL    Triglycerides 148 <=149 mg/dL    HDL Cholesterol 44 (L) >=50 mg/dL    LDL Calculated 121 <=129 mg/dL    Patient Fasting > 8hrs? Yes        Cholesterol is better than last year. Keep up the good work!    Please call with questions or contact us using [email or web address].    Sincerely,        Electronically signed by Ramón Kruger MD

## 2021-06-19 NOTE — LETTER
Letter by Ramón Kruger MD at      Author: Ramón Kruger MD Service: -- Author Type: --    Filed:  Encounter Date: 5/14/2019 Status: (Other)         Tanisha Lea  [address] [facility] [address]             May 14, 2019         Dear Ms. Lea,    Below are the results from your recent visit:    Resulted Orders   Lipid Profile   Result Value Ref Range    Triglycerides 174 (H) <=149 mg/dL    Cholesterol 209 (H) <=199 mg/dL    LDL Calculated 131 (H) <=129 mg/dL    HDL Cholesterol 43 (L) >=50 mg/dL    Patient Fasting > 8hrs? Yes    Comprehensive Metabolic Panel   Result Value Ref Range    Sodium 141 136 - 145 mmol/L    Potassium 4.2 3.5 - 5.0 mmol/L    Chloride 107 98 - 107 mmol/L    CO2 21 (L) 22 - 31 mmol/L    Anion Gap, Calculation 13 5 - 18 mmol/L    Glucose 87 70 - 125 mg/dL    BUN 17 8 - 22 mg/dL    Creatinine 0.86 0.60 - 1.10 mg/dL    GFR MDRD Af Amer >60 >60 mL/min/1.73m2    GFR MDRD Non Af Amer >60 >60 mL/min/1.73m2    Bilirubin, Total 0.4 0.0 - 1.0 mg/dL    Calcium 9.2 8.5 - 10.5 mg/dL    Protein, Total 7.0 6.0 - 8.0 g/dL    Albumin 3.7 3.5 - 5.0 g/dL    Alkaline Phosphatase 94 45 - 120 U/L    AST 18 0 - 40 U/L    ALT 16 0 - 45 U/L    Narrative    Fasting Glucose reference range is 70-99 mg/dL per  American Diabetes Association (ADA) guidelines.       Your cholesterol looks slightly higher than last year.  Keep on working on diet and exercise, we should recheck in 6 months.  Your kidney and liver tests are normal.    Please call with questions or contact us using [email or web address].    Sincerely,        Electronically signed by Ramón Kruger MD

## 2021-06-20 NOTE — LETTER
Letter by Ramón Kruger MD at      Author: Ramón Kruger MD Service: -- Author Type: --    Filed:  Encounter Date: 10/13/2020 Status: (Other)         Tanisha Lea  [address] [facility] [address]             October 13, 2020         Dear Ms. Lea,    Below are the results from your recent visit:    Resulted Orders   Mammo Screening Bilateral    Narrative    BILATERAL FULL FIELD DIGITAL SCREENING MAMMOGRAM WITH TOMOSYNTHESIS    Performed on: 10/12/20.      Compared to: 10/07/2019 Mammo Screening Bilateral, 09/25/2018 Mammo   Screening Bilateral, 09/13/2017 Mammo Screening Bilateral, 08/09/2016   Mammo Screening Bilateral, 04/30/2015 Mammo Screening Bilateral,   04/29/2014 Mammo Screening Bilateral, 04/25/2013 Mammo Screening   Bilateral, 04/23/2012 Mammo Screening Bilateral, and 04/20/2011 Mammo   Screening Bilateral    Findings: The breasts have scattered areas of fibroglandular densities.   There is no radiographic evidence of malignancy. This study was evaluated   with the assistance of Computer-Aided Detection. Breast Tomosynthesis was   used in interpretation. Routine screening mammogram in one year is   recommended.    ACR BI-RADS Category 1: Negative       Your mammogram is normal.    Please call with questions or contact us using [email or web address].    Sincerely,        Electronically signed by Ramón Kruger MD

## 2021-06-21 NOTE — LETTER
Letter by Ramón Kruger MD at      Author: Ramón Kruger MD Service: -- Author Type: --    Filed:  Encounter Date: 3/19/2021 Status: (Other)         Tanisha Lea  [address] [facility] [address]             March 19, 2021         Dear Ms. Lea,    Below are the results from your recent visit:    Resulted Orders   Comprehensive Metabolic Panel   Result Value Ref Range    Sodium 143 136 - 145 mmol/L    Potassium 4.3 3.5 - 5.0 mmol/L    Chloride 108 (H) 98 - 107 mmol/L    CO2 26 22 - 31 mmol/L    Anion Gap, Calculation 9 5 - 18 mmol/L    Glucose 97 70 - 125 mg/dL    BUN 15 8 - 22 mg/dL    Creatinine 0.87 0.60 - 1.10 mg/dL    GFR MDRD Af Amer >60 >60 mL/min/1.73m2    GFR MDRD Non Af Amer >60 >60 mL/min/1.73m2    Bilirubin, Total 0.5 0.0 - 1.0 mg/dL    Calcium 8.6 8.5 - 10.5 mg/dL    Protein, Total 6.9 6.0 - 8.0 g/dL    Albumin 3.7 3.5 - 5.0 g/dL    Alkaline Phosphatase 95 45 - 120 U/L    AST 23 0 - 40 U/L    ALT 18 0 - 45 U/L    Narrative    Fasting Glucose reference range is 70-99 mg/dL per  American Diabetes Association (ADA) guidelines.   Lipid Cascade RANDOM   Result Value Ref Range    Cholesterol 202 (H) <=199 mg/dL    Triglycerides 141 <=149 mg/dL    HDL Cholesterol 46 (L) >=50 mg/dL    LDL Calculated 128 <=129 mg/dL    Patient Fasting > 8hrs? Yes    Glycosylated Hemoglobin A1c   Result Value Ref Range    Hemoglobin A1c 5.5 <=5.6 %       Labs look good.       Please let me know if you have any questions.     Sincerely,        Electronically signed by Ramón Kruger MD

## 2021-07-02 ENCOUNTER — AMBULATORY - HEALTHEAST (OUTPATIENT)
Dept: FAMILY MEDICINE | Facility: CLINIC | Age: 61
End: 2021-07-02

## 2021-07-02 DIAGNOSIS — E03.9 HYPOTHYROIDISM, UNSPECIFIED TYPE: ICD-10-CM

## 2021-07-02 RX ORDER — LEVOTHYROXINE SODIUM 50 UG/1
50 TABLET ORAL DAILY
Qty: 30 TABLET | Refills: 5 | Status: SHIPPED | OUTPATIENT
Start: 2021-07-02 | End: 2021-11-11

## 2021-07-13 ENCOUNTER — RECORDS - HEALTHEAST (OUTPATIENT)
Dept: ADMINISTRATIVE | Facility: CLINIC | Age: 61
End: 2021-07-13

## 2021-07-21 ENCOUNTER — RECORDS - HEALTHEAST (OUTPATIENT)
Dept: ADMINISTRATIVE | Facility: CLINIC | Age: 61
End: 2021-07-21

## 2021-08-06 NOTE — PROGRESS NOTES
SUBJECTIVE:   CC: Tanisha Lea is an 60 year old woman who presents for preventive health visit.     Her for physical.   Hair loss is improving after starting thyroid med, synthroid 50 mcg daily started on 7/2/21.   Monitoring BP at home, always < 140/90. No acute headache, no weakness.         Healthy Habits:     Getting at least 3 servings of Calcium per day:  Yes    Bi-annual eye exam:  Yes    Dental care twice a year:  Yes    Sleep apnea or symptoms of sleep apnea:  Excessive snoring    Diet:  Regular (no restrictions)    Frequency of exercise:  2-3 days/week    Duration of exercise:  15-30 minutes    Taking medications regularly:  Yes    Medication side effects:  None    PHQ-2 Total Score: 0    Additional concerns today:  No        Today's PHQ-2 Score:   PHQ-2 ( 1999 Pfizer) 8/6/2021   Q1: Little interest or pleasure in doing things 0   Q2: Feeling down, depressed or hopeless 0   PHQ-2 Score 0   Q1: Little interest or pleasure in doing things Not at all   Q2: Feeling down, depressed or hopeless Not at all   PHQ-2 Score 0       Abuse: Current or Past (Physical, Sexual or Emotional) - No  Do you feel safe in your environment? Yes    Have you ever done Advance Care Planning? (For example, a Health Directive, POLST, or a discussion with a medical provider or your loved ones about your wishes): Yes, patient states has an Advance Care Planning document and will bring a copy to the clinic.    Social History     Tobacco Use     Smoking status: Never Smoker     Smokeless tobacco: Never Used   Substance Use Topics     Alcohol use: Never     If you drink alcohol do you typically have >3 drinks per day or >7 drinks per week? No    No flowsheet data found.      Breast Cancer Screening:  Any new diagnosis of family breast, ovarian, or bowel cancer? No  Brother was diagnosis with Cancer brain       click delete button to remove this line now    Pertinent mammograms are reviewed under the imaging tab.    History of abnormal  "Pap smear: NO - age 30-65 PAP every 5 years with negative HPV co-testing recommended  PAP / HPV 8/10/2017   PAP Negative for squamous intraepithelial lesion or malignancy  Electronically signed by Susie Weeks CT (ASCP) on 8/18/2017 at 10:00 AM       Reviewed and updated as needed this visit by clinical staff  Tobacco  Allergies       Soc Hx        Reviewed and updated as needed this visit by Provider                    Review of Systems  CONSTITUTIONAL: NEGATIVE for fever, chills, change in weight  INTEGUMENTARY/SKIN: NEGATIVE for worrisome rashes, moles or lesions  EYES: NEGATIVE for vision changes or irritation  ENT: NEGATIVE for ear, mouth and throat problems  RESP: NEGATIVE for significant cough or SOB  BREAST: NEGATIVE for masses, tenderness or discharge  CV: NEGATIVE for chest pain, palpitations or peripheral edema  GI: NEGATIVE for nausea, abdominal pain, heartburn, or change in bowel habits  : NEGATIVE for unusual urinary or vaginal symptoms. No vaginal bleeding.  MUSCULOSKELETAL: NEGATIVE for significant arthralgias or myalgia  NEURO: NEGATIVE for weakness, dizziness or paresthesias  PSYCHIATRIC: NEGATIVE for changes in mood or affect      OBJECTIVE:   BP (!) 164/88 (BP Location: Left arm, Patient Position: Sitting, Cuff Size: Adult Large)   Pulse 97   Temp 98.4  F (36.9  C) (Oral)   Resp 16   Ht 1.702 m (5' 7\")   Wt 126.2 kg (278 lb 5 oz)   SpO2 (!) 88%   BMI 43.59 kg/m    Physical Exam  GENERAL: healthy, alert and no distress  RESP: lungs clear to auscultation - no rales, rhonchi or wheezes  BREAST: Declined. Deferred.   CV: regular rate and rhythm, normal S1 S2, no S3 or S4, no murmur, click or rub, no peripheral edema and peripheral pulses strong  ABDOMEN: soft, nontender, no hepatosplenomegaly, no masses and bowel sounds normal   (female): deferred  MS: no gross musculoskeletal defects noted, no edema        ASSESSMENT/PLAN:     Annual physical exam  No pap due   Due for " "colonoscopy, last in 2016. Polyp   She will call and schedule for mammogram.    Essential hypertension  Uncontrolled. Increased lisinopril from 10 mg to 15 mg.  She will continue to monitor BP at home. Will let me know if > 140/90  - lisinopril (ZESTRIL) 10 MG tablet; Take 1 tablet (10 mg) by mouth daily  - Extra Tube; Future  - Extra Tube  - lisinopril (ZESTRIL) 5 MG tablet; Take one tab daily with lisinopril 10 mg daily ( total 15 mg ).    Hypothyroidism, unspecified type  Recheck today, dose adjustment if needed.  - TSH with free T4 reflex    H/O colonoscopy with polypectomy  Due for colonoscopy, order placed.     Hyperlipidemia, unspecified hyperlipidemia type  Recheck at next visit.     Morbid obesity (H)    COUNSELING:  Reviewed preventive health counseling, as reflected in patient instructions       Regular exercise    Estimated body mass index is 43.59 kg/m  as calculated from the following:    Height as of this encounter: 1.702 m (5' 7\").    Weight as of this encounter: 126.2 kg (278 lb 5 oz).        She reports that she has never smoked. She has never used smokeless tobacco.      Counseling Resources:  ATP IV Guidelines  Pooled Cohorts Equation Calculator  Breast Cancer Risk Calculator  BRCA-Related Cancer Risk Assessment: FHS-7 Tool  FRAX Risk Assessment  ICSI Preventive Guidelines  Dietary Guidelines for Americans, 2010  USDA's MyPlate  ASA Prophylaxis  Lung CA Screening    Ramón Kruger MD  [facility]  "

## 2021-08-12 ENCOUNTER — OFFICE VISIT (OUTPATIENT)
Dept: FAMILY MEDICINE | Facility: CLINIC | Age: 61
End: 2021-08-12
Payer: COMMERCIAL

## 2021-08-12 VITALS
TEMPERATURE: 98.4 F | SYSTOLIC BLOOD PRESSURE: 152 MMHG | DIASTOLIC BLOOD PRESSURE: 88 MMHG | BODY MASS INDEX: 43.68 KG/M2 | HEIGHT: 67 IN | WEIGHT: 278.31 LBS | RESPIRATION RATE: 16 BRPM | OXYGEN SATURATION: 88 % | HEART RATE: 97 BPM

## 2021-08-12 DIAGNOSIS — Z86.0100 H/O COLONOSCOPY WITH POLYPECTOMY: ICD-10-CM

## 2021-08-12 DIAGNOSIS — E78.5 HYPERLIPIDEMIA, UNSPECIFIED HYPERLIPIDEMIA TYPE: ICD-10-CM

## 2021-08-12 DIAGNOSIS — I10 ESSENTIAL HYPERTENSION: ICD-10-CM

## 2021-08-12 DIAGNOSIS — Z98.890 H/O COLONOSCOPY WITH POLYPECTOMY: ICD-10-CM

## 2021-08-12 DIAGNOSIS — Z00.00 ANNUAL PHYSICAL EXAM: Primary | ICD-10-CM

## 2021-08-12 DIAGNOSIS — E03.9 HYPOTHYROIDISM, UNSPECIFIED TYPE: ICD-10-CM

## 2021-08-12 DIAGNOSIS — E66.01 MORBID OBESITY (H): ICD-10-CM

## 2021-08-12 LAB
HOLD SPECIMEN: NORMAL
TSH SERPL DL<=0.005 MIU/L-ACNC: 0.56 UIU/ML (ref 0.3–5)

## 2021-08-12 PROCEDURE — 84443 ASSAY THYROID STIM HORMONE: CPT | Performed by: FAMILY MEDICINE

## 2021-08-12 PROCEDURE — 99396 PREV VISIT EST AGE 40-64: CPT | Performed by: FAMILY MEDICINE

## 2021-08-12 PROCEDURE — 36415 COLL VENOUS BLD VENIPUNCTURE: CPT | Performed by: FAMILY MEDICINE

## 2021-08-12 RX ORDER — LISINOPRIL 10 MG/1
10 TABLET ORAL DAILY
Qty: 90 TABLET | Refills: 1 | Status: SHIPPED | OUTPATIENT
Start: 2021-08-12 | End: 2021-11-11

## 2021-08-12 RX ORDER — LISINOPRIL 5 MG/1
TABLET ORAL
Qty: 90 TABLET | Refills: 1 | Status: SHIPPED | OUTPATIENT
Start: 2021-08-12 | End: 2021-11-11

## 2021-08-12 ASSESSMENT — MIFFLIN-ST. JEOR: SCORE: 1855.05

## 2021-08-18 ENCOUNTER — TRANSFERRED RECORDS (OUTPATIENT)
Dept: HEALTH INFORMATION MANAGEMENT | Facility: CLINIC | Age: 61
End: 2021-08-18

## 2021-09-30 ENCOUNTER — TRANSFERRED RECORDS (OUTPATIENT)
Dept: HEALTH INFORMATION MANAGEMENT | Facility: CLINIC | Age: 61
End: 2021-09-30

## 2021-10-13 ENCOUNTER — ANCILLARY PROCEDURE (OUTPATIENT)
Dept: MAMMOGRAPHY | Facility: CLINIC | Age: 61
End: 2021-10-13
Attending: FAMILY MEDICINE
Payer: COMMERCIAL

## 2021-10-13 DIAGNOSIS — Z12.31 VISIT FOR SCREENING MAMMOGRAM: ICD-10-CM

## 2021-10-13 PROCEDURE — 77067 SCR MAMMO BI INCL CAD: CPT | Mod: TC | Performed by: INTERNAL MEDICINE

## 2021-10-13 PROCEDURE — 77063 BREAST TOMOSYNTHESIS BI: CPT | Mod: TC | Performed by: INTERNAL MEDICINE

## 2021-11-11 ENCOUNTER — OFFICE VISIT (OUTPATIENT)
Dept: FAMILY MEDICINE | Facility: CLINIC | Age: 61
End: 2021-11-11
Payer: COMMERCIAL

## 2021-11-11 VITALS
RESPIRATION RATE: 20 BRPM | WEIGHT: 284.56 LBS | OXYGEN SATURATION: 91 % | BODY MASS INDEX: 44.66 KG/M2 | SYSTOLIC BLOOD PRESSURE: 138 MMHG | TEMPERATURE: 98.1 F | DIASTOLIC BLOOD PRESSURE: 78 MMHG | HEIGHT: 67 IN | HEART RATE: 94 BPM

## 2021-11-11 DIAGNOSIS — Z23 HIGH PRIORITY FOR 2019-NCOV VACCINE: ICD-10-CM

## 2021-11-11 DIAGNOSIS — I10 ESSENTIAL HYPERTENSION: Primary | ICD-10-CM

## 2021-11-11 DIAGNOSIS — E66.01 MORBID OBESITY (H): ICD-10-CM

## 2021-11-11 DIAGNOSIS — E03.9 HYPOTHYROIDISM, UNSPECIFIED TYPE: ICD-10-CM

## 2021-11-11 PROCEDURE — 0004A COVID-19,PF,PFIZER (12+ YRS): CPT | Performed by: FAMILY MEDICINE

## 2021-11-11 PROCEDURE — 91300 COVID-19,PF,PFIZER (12+ YRS): CPT | Performed by: FAMILY MEDICINE

## 2021-11-11 PROCEDURE — 99213 OFFICE O/P EST LOW 20 MIN: CPT | Mod: 25 | Performed by: FAMILY MEDICINE

## 2021-11-11 RX ORDER — LISINOPRIL 5 MG/1
TABLET ORAL
Qty: 90 TABLET | Refills: 2 | Status: SHIPPED | OUTPATIENT
Start: 2021-11-11 | End: 2022-02-04

## 2021-11-11 RX ORDER — LEVOTHYROXINE SODIUM 50 UG/1
50 TABLET ORAL DAILY
Qty: 90 TABLET | Refills: 2 | Status: SHIPPED | OUTPATIENT
Start: 2021-11-11 | End: 2022-08-05

## 2021-11-11 RX ORDER — LISINOPRIL 10 MG/1
10 TABLET ORAL DAILY
Qty: 90 TABLET | Refills: 2 | Status: SHIPPED | OUTPATIENT
Start: 2021-11-11 | End: 2022-02-04

## 2021-11-11 ASSESSMENT — MIFFLIN-ST. JEOR: SCORE: 1878.4

## 2021-11-11 NOTE — PROGRESS NOTES
"ASSESMENT AND PLAN:  Essential hypertension  Controlled .   - lisinopril (ZESTRIL) 10 MG tablet; Take 1 tablet (10 mg) by mouth daily  - lisinopril (ZESTRIL) 5 MG tablet; Take one tab daily with lisinopril 10 mg daily ( total 15 mg ).    Hypothyroidism, unspecified type  - levothyroxine (SYNTHROID/LEVOTHROID) 50 MCG tablet; Take 1 tablet (50 mcg) by mouth daily  Normal TSH in 8/21.  Continue current dose.    Morbid obesity (H)  The following high BMI interventions were performed this visit: encouragement to exercise    High priority for 2019-nCoV vaccine  - COVID-19,PF,PFIZER (12+ Yrs PURPLE LABEL)      SUBJECTIVE: Tanisha Lea is here to follow up on HTN.  Monitoring BP at home . 110-120s/70- 80 range.   Currently on lisinopril 15 mg daily.  She also takes Synthroid 50 mcg daily.  No acute headache, acute vision changes or weakness.  No fever or URI symptoms.  No known exposure to COVID-19.    History reviewed. No pertinent past medical history.  Patient Active Problem List   Diagnosis     Tick bite of back     Screening for lipid disorders     Elevated blood pressure     Screening for diabetes mellitus     Hypertension     History of rectal or anal cancer     Hyperlipidemia     Morbid obesity (H)       Allergies:  No Known Allergies    History   Smoking Status     Never Smoker   Smokeless Tobacco     Never Used       Review of systems otherwise negative except as listed in HPI.   History   Smoking Status     Never Smoker   Smokeless Tobacco     Never Used       OBJECTICE: /78 (BP Location: Left arm, Patient Position: Sitting, Cuff Size: Adult Large)   Pulse 94   Temp 98.1  F (36.7  C) (Oral)   Resp 20   Ht 1.702 m (5' 7\")   Wt 129.1 kg (284 lb 9 oz)   SpO2 91%   BMI 44.57 kg/m          GEN-alert,  in no apparent distress.  HEENT- neck is supple.  CV-regular rate and rhythm with no murmur.   RESP-lungs clear to auscultation .  ABDOMEN- Soft , not tender.  SKIN-normal        Ramón Kruger MD   11/11/2021 "

## 2022-02-04 DIAGNOSIS — Z76.0 ENCOUNTER FOR MEDICATION REFILL: Primary | ICD-10-CM

## 2022-02-04 DIAGNOSIS — I10 ESSENTIAL HYPERTENSION: ICD-10-CM

## 2022-02-04 RX ORDER — LISINOPRIL 5 MG/1
TABLET ORAL
Qty: 90 TABLET | Refills: 2 | Status: SHIPPED | OUTPATIENT
Start: 2022-02-04 | End: 2022-10-28

## 2022-02-04 RX ORDER — LISINOPRIL 10 MG/1
TABLET ORAL
Qty: 90 TABLET | Refills: 2 | Status: SHIPPED | OUTPATIENT
Start: 2022-02-04 | End: 2022-10-28

## 2022-05-16 NOTE — TELEPHONE ENCOUNTER
Writer called the patient and notified per MD note below.    Detail Level: Zone Include Location In Plan?: No

## 2022-08-02 ASSESSMENT — ENCOUNTER SYMPTOMS
ABDOMINAL PAIN: 0
COUGH: 0
NAUSEA: 0
MYALGIAS: 0
HEADACHES: 0
ARTHRALGIAS: 0
PALPITATIONS: 0
NERVOUS/ANXIOUS: 0
DIZZINESS: 0
BREAST MASS: 0
CHILLS: 0
CONSTIPATION: 0
HEMATOCHEZIA: 0
WEAKNESS: 0
DYSURIA: 0
DIARRHEA: 0
PARESTHESIAS: 0
SHORTNESS OF BREATH: 0
SORE THROAT: 0
FREQUENCY: 0
FEVER: 0
EYE PAIN: 0
JOINT SWELLING: 0
HEARTBURN: 0
HEMATURIA: 0

## 2022-08-04 DIAGNOSIS — E03.9 HYPOTHYROIDISM, UNSPECIFIED TYPE: ICD-10-CM

## 2022-08-04 DIAGNOSIS — Z76.0 ENCOUNTER FOR MEDICATION REFILL: Primary | ICD-10-CM

## 2022-08-05 RX ORDER — LEVOTHYROXINE SODIUM 50 UG/1
TABLET ORAL
Qty: 90 TABLET | Refills: 3 | Status: SHIPPED | OUTPATIENT
Start: 2022-08-05 | End: 2022-08-09

## 2022-08-09 ENCOUNTER — OFFICE VISIT (OUTPATIENT)
Dept: FAMILY MEDICINE | Facility: CLINIC | Age: 62
End: 2022-08-09
Payer: COMMERCIAL

## 2022-08-09 VITALS
DIASTOLIC BLOOD PRESSURE: 96 MMHG | BODY MASS INDEX: 45.28 KG/M2 | TEMPERATURE: 98.2 F | SYSTOLIC BLOOD PRESSURE: 148 MMHG | OXYGEN SATURATION: 96 % | HEIGHT: 67 IN | WEIGHT: 288.5 LBS | HEART RATE: 85 BPM

## 2022-08-09 DIAGNOSIS — Z12.4 CERVICAL CANCER SCREENING: ICD-10-CM

## 2022-08-09 DIAGNOSIS — Z76.0 ENCOUNTER FOR MEDICATION REFILL: ICD-10-CM

## 2022-08-09 DIAGNOSIS — I10 HYPERTENSION, UNSPECIFIED TYPE: ICD-10-CM

## 2022-08-09 DIAGNOSIS — E78.5 HYPERLIPIDEMIA, UNSPECIFIED HYPERLIPIDEMIA TYPE: ICD-10-CM

## 2022-08-09 DIAGNOSIS — E03.9 HYPOTHYROIDISM, UNSPECIFIED TYPE: ICD-10-CM

## 2022-08-09 DIAGNOSIS — I10 HYPERTENSION, UNSPECIFIED TYPE: Primary | ICD-10-CM

## 2022-08-09 DIAGNOSIS — Z13.1 SCREENING FOR DIABETES MELLITUS: ICD-10-CM

## 2022-08-09 DIAGNOSIS — Z00.00 ROUTINE GENERAL MEDICAL EXAMINATION AT A HEALTH CARE FACILITY: Primary | ICD-10-CM

## 2022-08-09 LAB
ALBUMIN SERPL BCG-MCNC: 4.2 G/DL (ref 3.5–5.2)
ALP SERPL-CCNC: 88 U/L (ref 35–104)
ALT SERPL W P-5'-P-CCNC: 14 U/L (ref 10–35)
ANION GAP SERPL CALCULATED.3IONS-SCNC: 10 MMOL/L (ref 7–15)
AST SERPL W P-5'-P-CCNC: 26 U/L (ref 10–35)
BILIRUB SERPL-MCNC: 0.4 MG/DL
BUN SERPL-MCNC: 14.2 MG/DL (ref 8–23)
CALCIUM SERPL-MCNC: 9.3 MG/DL (ref 8.8–10.2)
CHLORIDE SERPL-SCNC: 106 MMOL/L (ref 98–107)
CHOLEST SERPL-MCNC: 219 MG/DL
CREAT SERPL-MCNC: 1.07 MG/DL (ref 0.51–0.95)
DEPRECATED HCO3 PLAS-SCNC: 25 MMOL/L (ref 22–29)
GFR SERPL CREATININE-BSD FRML MDRD: 58 ML/MIN/1.73M2
GLUCOSE SERPL-MCNC: 103 MG/DL (ref 70–99)
HBA1C MFR BLD: 5.5 % (ref 0–5.6)
HDLC SERPL-MCNC: 43 MG/DL
LDLC SERPL CALC-MCNC: 144 MG/DL
NONHDLC SERPL-MCNC: 176 MG/DL
POTASSIUM SERPL-SCNC: 4.2 MMOL/L (ref 3.4–5.3)
PROT SERPL-MCNC: 7.2 G/DL (ref 6.4–8.3)
SODIUM SERPL-SCNC: 141 MMOL/L (ref 136–145)
T4 FREE SERPL-MCNC: 0.93 NG/DL (ref 0.9–1.7)
TRIGL SERPL-MCNC: 162 MG/DL
TSH SERPL DL<=0.005 MIU/L-ACNC: 21 UIU/ML (ref 0.3–4.2)

## 2022-08-09 PROCEDURE — 80061 LIPID PANEL: CPT | Performed by: FAMILY MEDICINE

## 2022-08-09 PROCEDURE — 83036 HEMOGLOBIN GLYCOSYLATED A1C: CPT | Performed by: FAMILY MEDICINE

## 2022-08-09 PROCEDURE — 36415 COLL VENOUS BLD VENIPUNCTURE: CPT | Performed by: FAMILY MEDICINE

## 2022-08-09 PROCEDURE — 84439 ASSAY OF FREE THYROXINE: CPT | Performed by: FAMILY MEDICINE

## 2022-08-09 PROCEDURE — 80053 COMPREHEN METABOLIC PANEL: CPT | Performed by: FAMILY MEDICINE

## 2022-08-09 PROCEDURE — 84443 ASSAY THYROID STIM HORMONE: CPT | Performed by: FAMILY MEDICINE

## 2022-08-09 PROCEDURE — G0145 SCR C/V CYTO,THINLAYER,RESCR: HCPCS | Performed by: FAMILY MEDICINE

## 2022-08-09 PROCEDURE — 87624 HPV HI-RISK TYP POOLED RSLT: CPT | Performed by: FAMILY MEDICINE

## 2022-08-09 PROCEDURE — 99396 PREV VISIT EST AGE 40-64: CPT | Performed by: FAMILY MEDICINE

## 2022-08-09 RX ORDER — LEVOTHYROXINE SODIUM 75 UG/1
75 TABLET ORAL DAILY
Qty: 90 TABLET | Refills: 0 | Status: SHIPPED | OUTPATIENT
Start: 2022-08-09 | End: 2022-09-22

## 2022-08-09 ASSESSMENT — ENCOUNTER SYMPTOMS
DYSURIA: 0
MYALGIAS: 0
ARTHRALGIAS: 0
HEMATOCHEZIA: 0
PARESTHESIAS: 0
BREAST MASS: 0
COUGH: 0
PALPITATIONS: 0
EYE PAIN: 0
NAUSEA: 0
DIARRHEA: 0
FREQUENCY: 0
HEMATURIA: 0
DIZZINESS: 0
SORE THROAT: 0
NERVOUS/ANXIOUS: 0
WEAKNESS: 0
HEADACHES: 0
SHORTNESS OF BREATH: 0
ABDOMINAL PAIN: 0
CONSTIPATION: 0
JOINT SWELLING: 0
HEARTBURN: 0
CHILLS: 0
FEVER: 0

## 2022-08-09 NOTE — PROGRESS NOTES
SUBJECTIVE:   CC: Tanisha Lea is an 61 year old woman who presents for preventive health visit.     {Split Bill scripting  The purpose of this visit is to discuss your medical history and prevent health problems before you are sick. You may be responsible for a co-pay, coinsurance, or deductible if your visit today includes services such as checking on a sore throat, having an x-ray or lab test, or treating and evaluating a new or existing condition :  Patient has been advised of split billing requirements and indicates understanding: Yes     Checking BP at home, brought in log, always <120/80  Thinks thyroid med is working OK. No symptoms.   Denied abnormal vg bleeding.    Healthy Habits:     Getting at least 3 servings of Calcium per day:  Yes    Bi-annual eye exam:  Yes    Dental care twice a year:  Yes    Sleep apnea or symptoms of sleep apnea:  None    Diet:  Regular (no restrictions)    Frequency of exercise:  2-3 days/week    Duration of exercise:  N/A    Taking medications regularly:  Yes    Medication side effects:  Not applicable    PHQ-2 Total Score: 0    Additional concerns today:  No              Today's PHQ-2 Score:   PHQ-2 ( 1999 Pfizer) 8/2/2022   Q1: Little interest or pleasure in doing things 0   Q2: Feeling down, depressed or hopeless 0   PHQ-2 Score 0   PHQ-2 Total Score (12-17 Years)- Positive if 3 or more points; Administer PHQ-A if positive -   Q1: Little interest or pleasure in doing things Not at all   Q2: Feeling down, depressed or hopeless Not at all   PHQ-2 Score 0       Abuse: Current or Past (Physical, Sexual or Emotional) - No  Do you feel safe in your environment? Yes        Social History     Tobacco Use     Smoking status: Never Smoker     Smokeless tobacco: Never Used   Substance Use Topics     Alcohol use: Never     If you drink alcohol do you typically have >3 drinks per day or >7 drinks per week? No    Alcohol Use 8/2/2022   Prescreen: >3 drinks/day or >7 drinks/week? Not  Applicable   Prescreen: >3 drinks/day or >7 drinks/week? -   No flowsheet data found.        Breast Cancer Screening:    FHS-7:   Breast CA Risk Assessment (FHS-7) 8/6/2021 10/13/2021 8/2/2022   Did any of your first-degree relatives have breast or ovarian cancer? No No No   Did any of your relatives have bilateral breast cancer? No No No   Did any man in your family have breast cancer? No No No   Did any woman in your family have breast and ovarian cancer? No No No   Did any woman in your family have breast cancer before age 50 y? No No No   Do you have 2 or more relatives with breast and/or ovarian cancer? No No No   Do you have 2 or more relatives with breast and/or bowel cancer? No No Unknown       Mammogram Screening: Recommended mammography every 1-2 years with patient discussion and risk factor consideration  Pertinent mammograms are reviewed under the imaging tab.    History of abnormal Pap smear: NO - age 30-65 PAP every 5 years with negative HPV co-testing recommended  PAP / HPV 8/10/2017   PAP Negative for squamous intraepithelial lesion or malignancy  Electronically signed by Susie Weeks CT (ASCP) on 8/18/2017 at 10:00 AM       Reviewed and updated as needed this visit by clinical staff   Tobacco  Allergies  Meds                Reviewed and updated as needed this visit by Provider                       Review of Systems   Constitutional: Negative for chills and fever.   HENT: Negative for congestion, ear pain, hearing loss and sore throat.    Eyes: Negative for pain and visual disturbance.   Respiratory: Negative for cough and shortness of breath.    Cardiovascular: Negative for chest pain, palpitations and peripheral edema.   Gastrointestinal: Negative for abdominal pain, constipation, diarrhea, heartburn, hematochezia and nausea.   Breasts:  Negative for tenderness, breast mass and discharge.   Genitourinary: Negative for dysuria, frequency, genital sores, hematuria, pelvic pain, urgency,  "vaginal bleeding and vaginal discharge.   Musculoskeletal: Negative for arthralgias, joint swelling and myalgias.   Skin: Negative for rash.   Neurological: Negative for dizziness, weakness, headaches and paresthesias.   Psychiatric/Behavioral: Negative for mood changes. The patient is not nervous/anxious.           OBJECTIVE:   Physical Exam     Vitals:    08/09/22 0912 08/09/22 0938   BP: (!) 156/102 (!) 148/96   BP Location: Right arm    Patient Position: Sitting    Cuff Size: Adult Large    Pulse: 85    Temp: 98.2  F (36.8  C)    TempSrc: Oral    SpO2: 96%    Weight: 130.9 kg (288 lb 8 oz)    Height: 1.702 m (5' 7\")              ASSESSMENT/PLAN:   Routine general medical examination at a health care facility  Normal mammogram in 10/21    Cervical cancer screening  - Pap Screen with HPV - recommended age 30 - 65 years    Hypertension, unspecified type  Home BP within normal range. She will continue to monitor and let me know if > 140/90  - Comprehensive metabolic panel (BMP + Alb, Alk Phos, ALT, AST, Total. Bili, TP); Future  - Comprehensive metabolic panel (BMP + Alb, Alk Phos, ALT, AST, Total. Bili, TP)    Hyperlipidemia, unspecified hyperlipidemia type  Start med if indicated.  - Lipid panel; Future  - Lipid panel    Hypothyroidism, unspecified type  Med dose adjustment if needed after results  - TSH with free T4 reflex; Future  - TSH with free T4 reflex    Screening for diabetes mellitus  - Hemoglobin A1c; Future  - Hemoglobin A1c        COUNSELING:  Reviewed preventive health counseling, as reflected in patient instructions       Regular exercise       Healthy diet/nutrition    Estimated body mass index is 45.19 kg/m  as calculated from the following:    Height as of this encounter: 1.702 m (5' 7\").    Weight as of this encounter: 130.9 kg (288 lb 8 oz).        She reports that she has never smoked. She has never used smokeless tobacco.      Counseling Resources:  ATP IV Guidelines  Pooled Cohorts Equation " Calculator  Breast Cancer Risk Calculator  BRCA-Related Cancer Risk Assessment: FHS-7 Tool  FRAX Risk Assessment  ICSI Preventive Guidelines  Dietary Guidelines for Americans, 2010  USDA's MyPlate  ASA Prophylaxis  Lung CA Screening    Ramón Kruger MD  [facility]

## 2022-08-12 LAB
BKR LAB AP GYN ADEQUACY: NORMAL
BKR LAB AP GYN INTERPRETATION: NORMAL
BKR LAB AP HPV REFLEX: NORMAL
BKR LAB AP PREVIOUS ABNORMAL: NORMAL
PATH REPORT.COMMENTS IMP SPEC: NORMAL
PATH REPORT.COMMENTS IMP SPEC: NORMAL
PATH REPORT.RELEVANT HX SPEC: NORMAL

## 2022-08-16 LAB
HUMAN PAPILLOMA VIRUS 16 DNA: NEGATIVE
HUMAN PAPILLOMA VIRUS 18 DNA: NEGATIVE
HUMAN PAPILLOMA VIRUS FINAL DIAGNOSIS: NORMAL
HUMAN PAPILLOMA VIRUS OTHER HR: NEGATIVE

## 2022-08-17 PROBLEM — Z12.4 CERVICAL CANCER SCREENING: Status: ACTIVE | Noted: 2022-08-17

## 2022-09-20 ENCOUNTER — LAB (OUTPATIENT)
Dept: LAB | Facility: CLINIC | Age: 62
End: 2022-09-20
Payer: COMMERCIAL

## 2022-09-20 DIAGNOSIS — I10 HYPERTENSION, UNSPECIFIED TYPE: ICD-10-CM

## 2022-09-20 DIAGNOSIS — E03.9 HYPOTHYROIDISM, UNSPECIFIED TYPE: ICD-10-CM

## 2022-09-20 LAB
ANION GAP SERPL CALCULATED.3IONS-SCNC: 11 MMOL/L (ref 7–15)
BUN SERPL-MCNC: 17 MG/DL (ref 8–23)
CALCIUM SERPL-MCNC: 9 MG/DL (ref 8.8–10.2)
CHLORIDE SERPL-SCNC: 103 MMOL/L (ref 98–107)
CREAT SERPL-MCNC: 1.06 MG/DL (ref 0.51–0.95)
DEPRECATED HCO3 PLAS-SCNC: 25 MMOL/L (ref 22–29)
GFR SERPL CREATININE-BSD FRML MDRD: 59 ML/MIN/1.73M2
GLUCOSE SERPL-MCNC: 99 MG/DL (ref 70–99)
POTASSIUM SERPL-SCNC: 4.2 MMOL/L (ref 3.4–5.3)
SODIUM SERPL-SCNC: 139 MMOL/L (ref 136–145)
T4 FREE SERPL-MCNC: 1.22 NG/DL (ref 0.9–1.7)
TSH SERPL DL<=0.005 MIU/L-ACNC: 6.78 UIU/ML (ref 0.3–4.2)

## 2022-09-20 PROCEDURE — 84443 ASSAY THYROID STIM HORMONE: CPT

## 2022-09-20 PROCEDURE — 84439 ASSAY OF FREE THYROXINE: CPT

## 2022-09-20 PROCEDURE — 36415 COLL VENOUS BLD VENIPUNCTURE: CPT

## 2022-09-20 PROCEDURE — 80048 BASIC METABOLIC PNL TOTAL CA: CPT

## 2022-09-22 DIAGNOSIS — E03.9 HYPOTHYROIDISM, UNSPECIFIED TYPE: ICD-10-CM

## 2022-09-22 DIAGNOSIS — Z76.0 ENCOUNTER FOR MEDICATION REFILL: ICD-10-CM

## 2022-09-22 RX ORDER — LEVOTHYROXINE SODIUM 88 UG/1
88 TABLET ORAL DAILY
Qty: 90 TABLET | Refills: 1 | Status: SHIPPED | OUTPATIENT
Start: 2022-09-22 | End: 2023-03-13

## 2022-09-22 NOTE — TELEPHONE ENCOUNTER
Called pt and relayed results note. Lab appt was scheduled on 11/6.    FARHAT Buckley CemN RN  [facility]

## 2022-09-22 NOTE — TELEPHONE ENCOUNTER
----- Message from Ramón Kruger MD sent at 9/22/2022  2:54 PM CDT -----  Thyroid test is still abnormal.  We will need to increase her thyroid medication.  New prescription for levothyroxine 88 mcg daily sent to the patient's pharmacy.  We will recheck in about 6 weeks.  Future lab order placed.  Please call the patient.  Kidney function is still slightly abnormal but stable.  We will recheck when she comes in for follow-up.    Dr. Ramón Kruger  9/22/2022 2:54 PM

## 2022-09-25 ENCOUNTER — HEALTH MAINTENANCE LETTER (OUTPATIENT)
Age: 62
End: 2022-09-25

## 2022-10-18 ENCOUNTER — ANCILLARY PROCEDURE (OUTPATIENT)
Dept: MAMMOGRAPHY | Facility: CLINIC | Age: 62
End: 2022-10-18
Attending: FAMILY MEDICINE
Payer: COMMERCIAL

## 2022-10-18 DIAGNOSIS — Z12.31 VISIT FOR SCREENING MAMMOGRAM: ICD-10-CM

## 2022-10-18 PROCEDURE — 77067 SCR MAMMO BI INCL CAD: CPT | Mod: TC | Performed by: RADIOLOGY

## 2022-10-18 PROCEDURE — 77063 BREAST TOMOSYNTHESIS BI: CPT | Mod: TC | Performed by: RADIOLOGY

## 2022-10-28 DIAGNOSIS — I10 ESSENTIAL HYPERTENSION: ICD-10-CM

## 2022-10-28 DIAGNOSIS — Z76.0 ENCOUNTER FOR MEDICATION REFILL: Primary | ICD-10-CM

## 2022-10-28 RX ORDER — LISINOPRIL 5 MG/1
TABLET ORAL
Qty: 90 TABLET | Refills: 3 | Status: SHIPPED | OUTPATIENT
Start: 2022-10-28 | End: 2023-09-28

## 2022-10-28 RX ORDER — LISINOPRIL 10 MG/1
TABLET ORAL
Qty: 90 TABLET | Refills: 3 | Status: SHIPPED | OUTPATIENT
Start: 2022-10-28 | End: 2023-09-28

## 2022-11-03 ENCOUNTER — LAB (OUTPATIENT)
Dept: LAB | Facility: CLINIC | Age: 62
End: 2022-11-03
Payer: COMMERCIAL

## 2022-11-03 DIAGNOSIS — E03.9 HYPOTHYROIDISM, UNSPECIFIED TYPE: ICD-10-CM

## 2022-11-03 LAB — TSH SERPL DL<=0.005 MIU/L-ACNC: 1.64 UIU/ML (ref 0.3–4.2)

## 2022-11-03 PROCEDURE — 36415 COLL VENOUS BLD VENIPUNCTURE: CPT

## 2022-11-03 PROCEDURE — 84443 ASSAY THYROID STIM HORMONE: CPT

## 2023-03-13 DIAGNOSIS — Z76.0 ENCOUNTER FOR MEDICATION REFILL: ICD-10-CM

## 2023-03-13 DIAGNOSIS — E03.9 HYPOTHYROIDISM, UNSPECIFIED TYPE: ICD-10-CM

## 2023-03-13 RX ORDER — LEVOTHYROXINE SODIUM 88 UG/1
TABLET ORAL
Qty: 90 TABLET | Refills: 1 | Status: SHIPPED | OUTPATIENT
Start: 2023-03-13 | End: 2023-08-29

## 2023-03-13 NOTE — TELEPHONE ENCOUNTER
"Last Written Prescription Date:  9/22/2022  Last Fill Quantity: 90,  # refills: 1   Last office visit provider:  8/9/2022     Requested Prescriptions   Pending Prescriptions Disp Refills     levothyroxine (SYNTHROID/LEVOTHROID) 88 MCG tablet [Pharmacy Med Name: LEVOTHYROXINE 0.088MG (88MCG) TAB] 90 tablet 1     Sig: TAKE 1 TABLET(88 MCG) BY MOUTH DAILY       Thyroid Protocol Passed - 3/13/2023  3:17 AM        Passed - Patient is 12 years or older        Passed - Recent (12 mo) or future (30 days) visit within the authorizing provider's specialty     Patient has had an office visit with the authorizing provider or a provider within the authorizing providers department within the previous 12 mos or has a future within next 30 days. See \"Patient Info\" tab in inbasket, or \"Choose Columns\" in Meds & Orders section of the refill encounter.              Passed - Medication is active on med list        Passed - Normal TSH on file in past 12 months     Recent Labs   Lab Test 11/03/22  0930   TSH 1.64              Passed - No active pregnancy on record     If patient is pregnant or has had a positive pregnancy test, please check TSH.          Passed - No positive pregnancy test in past 12 months     If patient is pregnant or has had a positive pregnancy test, please check TSH.               So Pyle RN 03/13/23 3:02 PM      "

## 2023-06-12 DIAGNOSIS — E03.9 HYPOTHYROIDISM, UNSPECIFIED TYPE: ICD-10-CM

## 2023-06-12 DIAGNOSIS — Z76.0 ENCOUNTER FOR MEDICATION REFILL: ICD-10-CM

## 2023-06-12 RX ORDER — LEVOTHYROXINE SODIUM 88 UG/1
TABLET ORAL
Qty: 90 TABLET | Refills: 1 | OUTPATIENT
Start: 2023-06-12

## 2023-07-10 ENCOUNTER — PATIENT OUTREACH (OUTPATIENT)
Dept: CARE COORDINATION | Facility: CLINIC | Age: 63
End: 2023-07-10
Payer: COMMERCIAL

## 2023-08-06 DIAGNOSIS — Z76.0 ENCOUNTER FOR MEDICATION REFILL: ICD-10-CM

## 2023-08-06 DIAGNOSIS — I10 ESSENTIAL HYPERTENSION: ICD-10-CM

## 2023-08-06 RX ORDER — LISINOPRIL 10 MG/1
TABLET ORAL
Qty: 90 TABLET | Refills: 3 | OUTPATIENT
Start: 2023-08-06

## 2023-08-06 RX ORDER — LISINOPRIL 5 MG/1
TABLET ORAL
Qty: 90 TABLET | Refills: 3 | OUTPATIENT
Start: 2023-08-06

## 2023-08-28 ENCOUNTER — MYC MEDICAL ADVICE (OUTPATIENT)
Dept: FAMILY MEDICINE | Facility: CLINIC | Age: 63
End: 2023-08-28
Payer: COMMERCIAL

## 2023-08-28 DIAGNOSIS — Z76.0 ENCOUNTER FOR MEDICATION REFILL: ICD-10-CM

## 2023-08-28 DIAGNOSIS — E03.9 HYPOTHYROIDISM, UNSPECIFIED TYPE: ICD-10-CM

## 2023-08-28 NOTE — TELEPHONE ENCOUNTER
Pt requesting refill for levothyroxine (SYNTHROID/LEVOTHROID) 88 MCG tablet. Pt has upcoming appt on 9/28.        FARHAT Buckley CemN RN  [facility]

## 2023-08-29 RX ORDER — LEVOTHYROXINE SODIUM 88 UG/1
88 TABLET ORAL DAILY
Qty: 90 TABLET | Refills: 0 | Status: SHIPPED | OUTPATIENT
Start: 2023-08-29 | End: 2023-09-28

## 2023-09-21 ASSESSMENT — ENCOUNTER SYMPTOMS
ARTHRALGIAS: 0
SORE THROAT: 0
COUGH: 0
WEAKNESS: 0
PARESTHESIAS: 0
PALPITATIONS: 0
JOINT SWELLING: 0
EYE PAIN: 0
HEMATURIA: 0
FREQUENCY: 0
DIZZINESS: 0
HEARTBURN: 0
MYALGIAS: 0
HEMATOCHEZIA: 0
SHORTNESS OF BREATH: 0
NAUSEA: 0
NERVOUS/ANXIOUS: 0
DIARRHEA: 0
CONSTIPATION: 0
BREAST MASS: 0
DYSURIA: 0
FEVER: 0
CHILLS: 0
HEADACHES: 0
ABDOMINAL PAIN: 0

## 2023-09-28 ENCOUNTER — OFFICE VISIT (OUTPATIENT)
Dept: FAMILY MEDICINE | Facility: CLINIC | Age: 63
End: 2023-09-28
Payer: COMMERCIAL

## 2023-09-28 VITALS
TEMPERATURE: 97.9 F | BODY MASS INDEX: 43.22 KG/M2 | HEART RATE: 84 BPM | DIASTOLIC BLOOD PRESSURE: 90 MMHG | HEIGHT: 67 IN | SYSTOLIC BLOOD PRESSURE: 148 MMHG | RESPIRATION RATE: 18 BRPM | WEIGHT: 275.4 LBS | OXYGEN SATURATION: 97 %

## 2023-09-28 DIAGNOSIS — Z23 IMMUNIZATION DUE: ICD-10-CM

## 2023-09-28 DIAGNOSIS — E03.9 HYPOTHYROIDISM, UNSPECIFIED TYPE: ICD-10-CM

## 2023-09-28 DIAGNOSIS — I10 ESSENTIAL HYPERTENSION: ICD-10-CM

## 2023-09-28 DIAGNOSIS — Z00.00 ANNUAL PHYSICAL EXAM: Primary | ICD-10-CM

## 2023-09-28 LAB
ANION GAP SERPL CALCULATED.3IONS-SCNC: 11 MMOL/L (ref 7–15)
BUN SERPL-MCNC: 17.1 MG/DL (ref 8–23)
CALCIUM SERPL-MCNC: 9.5 MG/DL (ref 8.8–10.2)
CHLORIDE SERPL-SCNC: 108 MMOL/L (ref 98–107)
CHOLEST SERPL-MCNC: 199 MG/DL
CREAT SERPL-MCNC: 0.91 MG/DL (ref 0.51–0.95)
EGFRCR SERPLBLD CKD-EPI 2021: 70 ML/MIN/1.73M2
GLUCOSE SERPL-MCNC: 100 MG/DL (ref 70–99)
HCO3 SERPL-SCNC: 22 MMOL/L (ref 22–29)
HDLC SERPL-MCNC: 41 MG/DL
HOLD SPECIMEN: NORMAL
LDLC SERPL CALC-MCNC: 123 MG/DL
NONHDLC SERPL-MCNC: 158 MG/DL
POTASSIUM SERPL-SCNC: 4.2 MMOL/L (ref 3.4–5.3)
SODIUM SERPL-SCNC: 141 MMOL/L (ref 135–145)
TRIGL SERPL-MCNC: 173 MG/DL
TSH SERPL DL<=0.005 MIU/L-ACNC: 1.31 UIU/ML (ref 0.3–4.2)

## 2023-09-28 PROCEDURE — 90471 IMMUNIZATION ADMIN: CPT | Performed by: FAMILY MEDICINE

## 2023-09-28 PROCEDURE — 84443 ASSAY THYROID STIM HORMONE: CPT | Performed by: FAMILY MEDICINE

## 2023-09-28 PROCEDURE — 90682 RIV4 VACC RECOMBINANT DNA IM: CPT | Performed by: FAMILY MEDICINE

## 2023-09-28 PROCEDURE — 80048 BASIC METABOLIC PNL TOTAL CA: CPT | Performed by: FAMILY MEDICINE

## 2023-09-28 PROCEDURE — 36415 COLL VENOUS BLD VENIPUNCTURE: CPT | Performed by: FAMILY MEDICINE

## 2023-09-28 PROCEDURE — 80061 LIPID PANEL: CPT | Performed by: FAMILY MEDICINE

## 2023-09-28 PROCEDURE — 99396 PREV VISIT EST AGE 40-64: CPT | Mod: 25 | Performed by: FAMILY MEDICINE

## 2023-09-28 RX ORDER — LISINOPRIL 10 MG/1
10 TABLET ORAL DAILY
Qty: 90 TABLET | Refills: 3 | Status: SHIPPED | OUTPATIENT
Start: 2023-09-28 | End: 2024-09-06

## 2023-09-28 RX ORDER — LISINOPRIL 5 MG/1
TABLET ORAL
Qty: 90 TABLET | Refills: 3 | Status: SHIPPED | OUTPATIENT
Start: 2023-09-28 | End: 2024-09-06

## 2023-09-28 RX ORDER — LEVOTHYROXINE SODIUM 88 UG/1
88 TABLET ORAL DAILY
Qty: 90 TABLET | Refills: 0 | Status: SHIPPED | OUTPATIENT
Start: 2023-09-28 | End: 2023-12-05

## 2023-09-28 ASSESSMENT — ENCOUNTER SYMPTOMS
CONSTIPATION: 0
ARTHRALGIAS: 0
PALPITATIONS: 0
SORE THROAT: 0
JOINT SWELLING: 0
WEAKNESS: 0
NAUSEA: 0
HEMATURIA: 0
ABDOMINAL PAIN: 0
FREQUENCY: 0
HEMATOCHEZIA: 0
DYSURIA: 0
BREAST MASS: 0
EYE PAIN: 0
HEARTBURN: 0
DIZZINESS: 0
SHORTNESS OF BREATH: 0
PARESTHESIAS: 0
NERVOUS/ANXIOUS: 0
COUGH: 0
DIARRHEA: 0
HEADACHES: 0
CHILLS: 0
FEVER: 0
MYALGIAS: 0

## 2023-09-28 NOTE — PROGRESS NOTES
SUBJECTIVE:   CC: DENIS is an 62 year old who presents for preventive health visit.       2023     8:45 AM   Additional Questions   Roomed by Marsha ENGEL     On lisinopril 15 mg daily for HTN.   Home BP always <120/80. No acute headache, no acute vision changes. No weakness.   On synthroid 88 mcg daily. Last TSH 1.64 in .     Healthy Habits:     Getting at least 3 servings of Calcium per day:  Yes    Bi-annual eye exam:  Yes    Dental care twice a year:  Yes    Sleep apnea or symptoms of sleep apnea:  None    Diet:  Regular (no restrictions)    Frequency of exercise:  2-3 days/week    Duration of exercise:  15-30 minutes    Taking medications regularly:  Yes    Medication side effects:  None    Additional concerns today:  No      Today's PHQ-2 Score:       2023    10:13 AM   PHQ-2 (  [employer])   Q1: Little interest or pleasure in doing things 0   Q2: Feeling down, depressed or hopeless 0   PHQ-2 Score 0   Q1: Little interest or pleasure in doing things Not at all   Q2: Feeling down, depressed or hopeless Not at all   PHQ-2 Score 0       Social History     Tobacco Use    Smoking status: Never     Passive exposure: Never    Smokeless tobacco: Never   Substance Use Topics    Alcohol use: Never             2023     3:27 PM   Alcohol Use   Prescreen: >3 drinks/day or >7 drinks/week? No     Reviewed orders with patient.  Reviewed health maintenance and updated orders accordingly - Yes  Lab work is in process    Breast Cancer Screenin/2/2022     4:53 PM 2023     3:30 PM   Breast CA Risk Assessment (FHS-7)   Do you have a family history of breast, colon, or ovarian cancer? Yes No / Unknown         Mammogram Screening: Recommended mammography every 1-2 years with patient discussion and risk factor consideration  Pertinent mammograms are reviewed under the imaging tab.    History of abnormal Pap smear: NO - age 30-65 PAP every 5 years with negative HPV co-testing recommended      Latest  "Ref Rng & Units 8/9/2022     9:41 AM 8/10/2017    12:45 PM   PAP / HPV   PAP  Negative for Intraepithelial Lesion or Malignancy (NILM)  Negative for squamous intraepithelial lesion or malignancy  Electronically signed by Susie Weeks CT (ASCP) on 8/18/2017 at 10:00 AM      HPV 16 DNA Negative Negative     HPV 18 DNA Negative Negative     Other HR HPV Negative Negative       Reviewed and updated as needed this visit by clinical staff   Tobacco  Allergies  Meds              Reviewed and updated as needed this visit by Provider                     Review of Systems   Constitutional:  Negative for chills and fever.   HENT:  Negative for congestion, ear pain, hearing loss and sore throat.    Eyes:  Negative for pain and visual disturbance.   Respiratory:  Negative for cough and shortness of breath.    Cardiovascular:  Negative for chest pain, palpitations and peripheral edema.   Gastrointestinal:  Negative for abdominal pain, constipation, diarrhea, heartburn, hematochezia and nausea.   Breasts:  Negative for tenderness, breast mass and discharge.   Genitourinary:  Negative for dysuria, frequency, genital sores, hematuria, pelvic pain, urgency, vaginal bleeding and vaginal discharge.   Musculoskeletal:  Negative for arthralgias, joint swelling and myalgias.   Skin:  Negative for rash.   Neurological:  Negative for dizziness, weakness, headaches and paresthesias.   Psychiatric/Behavioral:  Negative for mood changes. The patient is not nervous/anxious.         OBJECTIVE:     Vitals:    09/28/23 0846 09/28/23 1047   BP: (!) 171/92 (!) 148/90   Pulse: 84    Resp: 18    Temp: 97.9  F (36.6  C)    TempSrc: Oral    SpO2: 97%    Weight: 124.9 kg (275 lb 6.4 oz)    Height: 1.702 m (5' 7\")       Physical Exam  GENERAL: healthy, alert and no distress  NECK: no adenopathy, no asymmetry, masses, or scars and thyroid normal to palpation  RESP: lungs clear to auscultation - no rales, rhonchi or wheezes  CV: regular rate and " rhythm, normal S1 S2, no S3 or S4, no murmur, click or rub, no peripheral edema and peripheral pulses strong  ABDOMEN: soft, nontender, no hepatosplenomegaly, no masses and bowel sounds normal  MS: no gross musculoskeletal defects noted, no edema      ASSESSMENT/PLAN:   Annual physical exam      Hypothyroidism, unspecified type  - levothyroxine (SYNTHROID/LEVOTHROID) 88 MCG tablet; Take 1 tablet (88 mcg) by mouth daily    Essential hypertension  Brought in home BP cuff, same reading as clinic.  Uncontrolled but home BP always within normal range.   She will continue monitoring home BP daily for the next 2 weeks and send me [employer] message.  - lisinopril (ZESTRIL) 10 MG tablet; Take 1 tablet (10 mg) by mouth daily  - lisinopril (ZESTRIL) 5 MG tablet; TAKE 1 TABLET BY MOUTH DAILY WITH LISINOPRIL 10 MG( FOR A TOTAL DAILY DOSE OF 15 MG)    Immunization due  - INFLUENZA VACCINE 18-64Y (FLUBLOK)     COUNSELING:  Reviewed preventive health counseling, as reflected in patient instructions       Regular exercise       Healthy diet/nutrition        She reports that she has never smoked. She has never been exposed to tobacco smoke. She has never used smokeless tobacco.          Ramón Kruger MD  [facility]

## 2023-10-23 ENCOUNTER — ANCILLARY PROCEDURE (OUTPATIENT)
Dept: MAMMOGRAPHY | Facility: CLINIC | Age: 63
End: 2023-10-23
Attending: FAMILY MEDICINE
Payer: COMMERCIAL

## 2023-10-23 DIAGNOSIS — Z12.31 VISIT FOR SCREENING MAMMOGRAM: ICD-10-CM

## 2023-10-23 PROCEDURE — 77067 SCR MAMMO BI INCL CAD: CPT | Mod: TC | Performed by: RADIOLOGY

## 2023-10-23 PROCEDURE — 77063 BREAST TOMOSYNTHESIS BI: CPT | Mod: TC | Performed by: RADIOLOGY

## 2023-10-24 ENCOUNTER — PATIENT OUTREACH (OUTPATIENT)
Dept: GASTROENTEROLOGY | Facility: CLINIC | Age: 63
End: 2023-10-24
Payer: COMMERCIAL

## 2023-12-04 ENCOUNTER — MYC REFILL (OUTPATIENT)
Dept: FAMILY MEDICINE | Facility: CLINIC | Age: 63
End: 2023-12-04
Payer: COMMERCIAL

## 2023-12-04 DIAGNOSIS — E03.9 HYPOTHYROIDISM, UNSPECIFIED TYPE: ICD-10-CM

## 2023-12-04 RX ORDER — LEVOTHYROXINE SODIUM 88 UG/1
88 TABLET ORAL DAILY
Qty: 90 TABLET | Refills: 0 | OUTPATIENT
Start: 2023-12-04

## 2023-12-05 DIAGNOSIS — E03.9 HYPOTHYROIDISM, UNSPECIFIED TYPE: ICD-10-CM

## 2023-12-05 RX ORDER — LEVOTHYROXINE SODIUM 88 UG/1
88 TABLET ORAL DAILY
Qty: 90 TABLET | Refills: 1 | Status: SHIPPED | OUTPATIENT
Start: 2023-12-05 | End: 2024-05-22

## 2024-03-05 DIAGNOSIS — E03.9 HYPOTHYROIDISM, UNSPECIFIED TYPE: ICD-10-CM

## 2024-03-06 RX ORDER — LEVOTHYROXINE SODIUM 88 UG/1
88 TABLET ORAL DAILY
Qty: 90 TABLET | Refills: 1 | OUTPATIENT
Start: 2024-03-06

## 2024-05-22 ENCOUNTER — MYC REFILL (OUTPATIENT)
Dept: FAMILY MEDICINE | Facility: CLINIC | Age: 64
End: 2024-05-22
Payer: COMMERCIAL

## 2024-05-22 DIAGNOSIS — E03.9 HYPOTHYROIDISM, UNSPECIFIED TYPE: ICD-10-CM

## 2024-05-22 RX ORDER — LEVOTHYROXINE SODIUM 88 UG/1
88 TABLET ORAL DAILY
Qty: 90 TABLET | Refills: 0 | Status: SHIPPED | OUTPATIENT
Start: 2024-05-22 | End: 2024-08-16

## 2024-08-15 DIAGNOSIS — E03.9 HYPOTHYROIDISM, UNSPECIFIED TYPE: ICD-10-CM

## 2024-08-16 RX ORDER — LEVOTHYROXINE SODIUM 88 UG/1
88 TABLET ORAL DAILY
Qty: 90 TABLET | Refills: 0 | Status: SHIPPED | OUTPATIENT
Start: 2024-08-16

## 2024-09-06 ENCOUNTER — OFFICE VISIT (OUTPATIENT)
Dept: FAMILY MEDICINE | Facility: CLINIC | Age: 64
End: 2024-09-06
Payer: COMMERCIAL

## 2024-09-06 VITALS
BODY MASS INDEX: 44.98 KG/M2 | SYSTOLIC BLOOD PRESSURE: 146 MMHG | WEIGHT: 286.6 LBS | OXYGEN SATURATION: 98 % | TEMPERATURE: 97.8 F | HEART RATE: 85 BPM | HEIGHT: 67 IN | RESPIRATION RATE: 16 BRPM | DIASTOLIC BLOOD PRESSURE: 80 MMHG

## 2024-09-06 DIAGNOSIS — E03.9 HYPOTHYROIDISM, UNSPECIFIED TYPE: ICD-10-CM

## 2024-09-06 DIAGNOSIS — Z78.0 MENOPAUSE: ICD-10-CM

## 2024-09-06 DIAGNOSIS — I10 ESSENTIAL HYPERTENSION: ICD-10-CM

## 2024-09-06 DIAGNOSIS — Z23 IMMUNIZATION DUE: ICD-10-CM

## 2024-09-06 DIAGNOSIS — E78.2 MIXED HYPERLIPIDEMIA: ICD-10-CM

## 2024-09-06 DIAGNOSIS — Z00.00 ANNUAL PHYSICAL EXAM: Primary | ICD-10-CM

## 2024-09-06 DIAGNOSIS — E66.01 MORBID OBESITY (H): ICD-10-CM

## 2024-09-06 LAB
ANION GAP SERPL CALCULATED.3IONS-SCNC: 11 MMOL/L (ref 7–15)
BUN SERPL-MCNC: 18.8 MG/DL (ref 8–23)
CALCIUM SERPL-MCNC: 9 MG/DL (ref 8.8–10.4)
CHLORIDE SERPL-SCNC: 106 MMOL/L (ref 98–107)
CHOLEST SERPL-MCNC: 194 MG/DL
CREAT SERPL-MCNC: 1.08 MG/DL (ref 0.51–0.95)
EGFRCR SERPLBLD CKD-EPI 2021: 57 ML/MIN/1.73M2
FASTING STATUS PATIENT QL REPORTED: NO
FASTING STATUS PATIENT QL REPORTED: NO
GLUCOSE SERPL-MCNC: 98 MG/DL (ref 70–99)
HCO3 SERPL-SCNC: 22 MMOL/L (ref 22–29)
HDLC SERPL-MCNC: 39 MG/DL
LDLC SERPL CALC-MCNC: 125 MG/DL
NONHDLC SERPL-MCNC: 155 MG/DL
POTASSIUM SERPL-SCNC: 4.4 MMOL/L (ref 3.4–5.3)
SODIUM SERPL-SCNC: 139 MMOL/L (ref 135–145)
TRIGL SERPL-MCNC: 148 MG/DL
TSH SERPL DL<=0.005 MIU/L-ACNC: 1.89 UIU/ML (ref 0.3–4.2)

## 2024-09-06 PROCEDURE — 90662 IIV NO PRSV INCREASED AG IM: CPT | Performed by: FAMILY MEDICINE

## 2024-09-06 PROCEDURE — 99214 OFFICE O/P EST MOD 30 MIN: CPT | Mod: 25 | Performed by: FAMILY MEDICINE

## 2024-09-06 PROCEDURE — G0008 ADMIN INFLUENZA VIRUS VAC: HCPCS | Performed by: FAMILY MEDICINE

## 2024-09-06 PROCEDURE — 36415 COLL VENOUS BLD VENIPUNCTURE: CPT | Performed by: FAMILY MEDICINE

## 2024-09-06 PROCEDURE — 80048 BASIC METABOLIC PNL TOTAL CA: CPT | Performed by: FAMILY MEDICINE

## 2024-09-06 PROCEDURE — 80061 LIPID PANEL: CPT | Performed by: FAMILY MEDICINE

## 2024-09-06 PROCEDURE — G0402 INITIAL PREVENTIVE EXAM: HCPCS | Mod: 4MD | Performed by: FAMILY MEDICINE

## 2024-09-06 PROCEDURE — 84443 ASSAY THYROID STIM HORMONE: CPT | Performed by: FAMILY MEDICINE

## 2024-09-06 RX ORDER — LISINOPRIL 5 MG/1
TABLET ORAL
Qty: 90 TABLET | Refills: 3 | Status: SHIPPED | OUTPATIENT
Start: 2024-09-06

## 2024-09-06 RX ORDER — LISINOPRIL 10 MG/1
10 TABLET ORAL DAILY
Qty: 90 TABLET | Refills: 3 | Status: SHIPPED | OUTPATIENT
Start: 2024-09-06

## 2024-09-06 NOTE — PROGRESS NOTES
Preventive Care Visit  [facility] AUGUSTIN Kruger MD, Family Medicine  Sep 6, 2024      Annual physical exam      Essential hypertension  Blood pressure slightly above target but home blood pressure readings are always normal.  We had calibrated her home BP cuff with the office blood pressure cuff in the past.  She will continue to monitor blood pressure at home and let me know if consistently above 140/90.  - lisinopril (ZESTRIL) 10 MG tablet; Take 1 tablet (10 mg) by mouth daily.  - lisinopril (ZESTRIL) 5 MG tablet; TAKE 1 TABLET BY MOUTH DAILY WITH LISINOPRIL 10 MG( FOR A TOTAL DAILY DOSE OF 15 MG)  - Basic metabolic panel    Hypothyroidism, unspecified type  - TSH WITH FREE T4 REFLEX    Menopause  - DEXA HIP/PELVIS/SPINE - Future; Future    Mixed hyperlipidemia  - Lipid panel reflex to direct LDL Non-fasting    Morbid obesity (H)  The following high BMI interventions were performed this visit: encouragement to exercise and weight monitoring     Immunization due  - INFLUENZA HIGH DOSE, TRIVALENT, PF (FLUZONE)     Hermes BARRIOS is a 63 year old, presenting for the following:  Physical    HTN- She brought in home blood pressure log, blood pressure readings are always < 120/80.  States she is feeling fine.  She knows her blood pressure is always high when she comes to the clinic.  No acute headache, acute vision changes or weakness.  Currently on lisinopril 15 mg daily.    Hypothyroidism-currently on Synthroid 88 mcg daily.  TSH was normal in 9/23.        9/6/2024     8:15 AM   Additional Questions   Roomed by brian pennington   Accompanied by Self        Health Care Directive  Patient does not have a Health Care Directive or Living Will:       9/2/2024   General Health   How would you rate your overall physical health? Good   Feel stress (tense, anxious, or unable to sleep) Only a little      (!) STRESS CONCERN      9/2/2024   Nutrition   Diet: Regular (no restrictions)            9/2/2024   Exercise    Days per week of moderate/strenous exercise 4 days   Average minutes spent exercising at this level 20 min            9/2/2024   Social Factors   Frequency of gathering with friends or relatives Patient declined   Worry food won't last until get money to buy more No   Food not last or not have enough money for food? No   Do you have housing? (Housing is defined as stable permanent housing and does not include staying ouside in a car, in a tent, in an abandoned building, in an overnight shelter, or couch-surfing.) Yes   Are you worried about losing your housing? No   Lack of transportation? No   Unable to get utilities (heat,electricity)? No            9/2/2024   Fall Risk   Fallen 2 or more times in the past year? No    No   Trouble with walking or balance? No    No       Multiple values from one day are sorted in reverse-chronological order          9/2/2024   Activities of Daily Living- Home Safety   Needs help with the following daily activites None of the above   Safety concerns in the home None of the above            9/2/2024   Dental   Dentist two times every year? Yes            9/2/2024   Hearing Screening   Hearing concerns? None of the above            9/2/2024   Driving Risk Screening   Patient/family members have concerns about driving No            9/2/2024   General Alertness/Fatigue Screening   Have you been more tired than usual lately? No            9/2/2024   Urinary Incontinence Screening   Bothered by leaking urine in past 6 months No            9/2/2024   TB Screening   Were you born outside of the [place]? No            Today's PHQ-2 Score:       9/5/2024     9:58 AM   PHQ-2 ( 1999 [employer])   Q1: Little interest or pleasure in doing things 0   Q2: Feeling down, depressed or hopeless 0   PHQ-2 Score 0   Q1: Little interest or pleasure in doing things Not at all   Q2: Feeling down, depressed or hopeless Not at all   PHQ-2 Score 0           9/2/2024   Substance Use   Alcohol more than 3/day or more  than 7/wk Not Applicable   Do you have a current opioid prescription? No   How severe/bad is pain from 1 to 10? 0/10 (No Pain)   Do you use any other substances recreationally? No        Social History     Tobacco Use    Smoking status: Never     Passive exposure: Never    Smokeless tobacco: Never   Vaping Use    Vaping status: Never Used   Substance Use Topics    Alcohol use: Never    Drug use: Never           10/23/2023   LAST FHS-7 RESULTS   1st degree relative breast or ovarian cancer No   Any relative bilateral breast cancer Unknown   Any male have breast cancer No   Any ONE woman have BOTH breast AND ovarian cancer No   Any woman with breast cancer before 50yrs Unknown   2 or more relatives with breast AND/OR ovarian cancer No   2 or more relatives with breast AND/OR bowel cancer No          Mammogram Screening - Mammogram every 1-2 years updated in Health Maintenance based on mutual decision making          9/2/2024   One time HIV Screening   Previous HIV test? No        History of abnormal Pap smear: No - age 30- 64 PAP with HPV every 5 years recommended        Latest Ref Rng & Units 8/9/2022     9:41 AM 8/10/2017    12:45 PM   PAP / HPV   PAP  Negative for Intraepithelial Lesion or Malignancy (NILM)  Negative for squamous intraepithelial lesion or malignancy  Electronically signed by Susie Weeks CT (ASCP) on 8/18/2017 at 10:00 AM      HPV 16 DNA Negative Negative     HPV 18 DNA Negative Negative     Other HR HPV Negative Negative       ASCVD Risk   The 10-year ASCVD risk score (Indira CASPER, et al., 2019) is: 13.5%    Values used to calculate the score:      Age: 63 years      Sex: Female      Is Non- : No      Diabetic: No      Tobacco smoker: No      Systolic Blood Pressure: 164 mmHg      Is BP treated: Yes      HDL Cholesterol: 41 mg/dL      Total Cholesterol: 199 mg/dL                    Current providers sharing in care for this patient include:  Patient Care  "Team:  Ramón Kruger MD as PCP - General (Family Medicine)  Ramón Kruger MD as Assigned PCP    The following health maintenance items are reviewed in Epic and correct as of today:  Health Maintenance   Topic Date Due    DEXA  Never done    HIV SCREENING  Never done    HEPATITIS C SCREENING  Never done    INFLUENZA VACCINE (1) 09/01/2024    COVID-19 Vaccine (6 - 2023-24 season) 09/01/2024    MEDICARE ANNUAL WELLNESS VISIT  09/28/2024    LIPID  09/28/2024    TSH W/FREE T4 REFLEX  09/28/2024    ANNUAL REVIEW OF HM ORDERS  09/28/2024    FALL RISK ASSESSMENT  09/06/2025    MAMMO SCREENING  10/23/2025    ADVANCE CARE PLANNING  08/12/2026    GLUCOSE  09/28/2026    COLORECTAL CANCER SCREENING  09/30/2026    DTAP/TDAP/TD IMMUNIZATION (2 - Td or Tdap) 08/10/2027    PHQ-2 (once per calendar year)  Completed    Pneumococcal Vaccine: 65+ Years  Completed    ZOSTER IMMUNIZATION  Completed    RSV VACCINE  Completed    HPV IMMUNIZATION  Aged Out    MENINGITIS IMMUNIZATION  Aged Out    RSV MONOCLONAL ANTIBODY  Aged Out    PAP  Discontinued         Review of Systems  CONSTITUTIONAL: NEGATIVE for fever, chills, change in weight  ENT/MOUTH: NEGATIVE for ear, mouth and throat problems  RESP: NEGATIVE for significant cough or SOB  CV: NEGATIVE for chest pain/chest pressure     Objective    Exam  BP (!) 164/84 (BP Location: Left arm, Patient Position: Sitting, Cuff Size: Adult Large)   Pulse 85   Temp 97.8  F (36.6  C) (Oral)   Resp 16   Ht 1.7 m (5' 6.93\")   Wt 130 kg (286 lb 9.6 oz)   SpO2 98%   BMI 44.98 kg/m     Estimated body mass index is 44.98 kg/m  as calculated from the following:    Height as of this encounter: 1.7 m (5' 6.93\").    Weight as of this encounter: 130 kg (286 lb 9.6 oz).    Physical Exam  GENERAL: alert and no distress  NECK: Supple  RESP: lungs clear to auscultation - no rales, rhonchi or wheezes  CV: regular rate and rhythm, normal S1 S2, no S3 or S4, no murmur, click or rub, no peripheral edema  MS: no gross " musculoskeletal defects noted, no edema  PSYCH: mentation appears normal, affect normal/bright        9/6/2024   Mini Cog   Clock Draw Score 2 Normal   3 Item Recall 3 objects recalled   Mini Cog Total Score 5          Vision Screen  Reason Vision Screen Not Completed: Patient had exam in last 12 months  [id number]}    Signed Electronically by: Ramón Kruger MD

## 2024-09-09 DIAGNOSIS — R79.89 ABNORMAL BLOOD CREATININE LEVEL: Primary | ICD-10-CM

## 2024-09-23 ENCOUNTER — ANCILLARY PROCEDURE (OUTPATIENT)
Dept: BONE DENSITY | Facility: CLINIC | Age: 64
End: 2024-09-23
Attending: FAMILY MEDICINE
Payer: COMMERCIAL

## 2024-09-23 DIAGNOSIS — Z78.0 MENOPAUSE: ICD-10-CM

## 2024-09-23 PROCEDURE — 77089 TBS DXA CAL W/I&R FX RISK: CPT | Performed by: PHYSICIAN ASSISTANT

## 2024-09-23 PROCEDURE — 77080 DXA BONE DENSITY AXIAL: CPT | Mod: TC | Performed by: PHYSICIAN ASSISTANT

## 2024-09-24 ENCOUNTER — TELEPHONE (OUTPATIENT)
Dept: FAMILY MEDICINE | Facility: CLINIC | Age: 64
End: 2024-09-24
Payer: COMMERCIAL

## 2024-09-24 NOTE — TELEPHONE ENCOUNTER
----- Message from Ramón Kruger sent at 9/23/2024 11:50 AM CDT -----  Bone density test shows low bone density but no osteoporosis.  Please call the patient and advise to take over-the-counter vitamin D and calcium supplement.    Thank you,    Dr. Ramón Kruger  9/23/2024 11:50 AM      Spoke to patient on the phone to relay provider test results and recommendation above.  Patient verbalized understood with no further questions.    Milton Abraham RN  [facility]

## 2024-10-21 ENCOUNTER — LAB (OUTPATIENT)
Dept: LAB | Facility: CLINIC | Age: 64
End: 2024-10-21
Payer: COMMERCIAL

## 2024-10-21 DIAGNOSIS — R79.89 ABNORMAL BLOOD CREATININE LEVEL: ICD-10-CM

## 2024-10-21 LAB
CREAT SERPL-MCNC: 1.25 MG/DL (ref 0.51–0.95)
EGFRCR SERPLBLD CKD-EPI 2021: 47 ML/MIN/1.73M2

## 2024-10-21 PROCEDURE — 82565 ASSAY OF CREATININE: CPT

## 2024-10-21 PROCEDURE — 36415 COLL VENOUS BLD VENIPUNCTURE: CPT

## 2024-10-24 ENCOUNTER — TELEPHONE (OUTPATIENT)
Dept: FAMILY MEDICINE | Facility: CLINIC | Age: 64
End: 2024-10-24

## 2024-10-24 ENCOUNTER — ANCILLARY PROCEDURE (OUTPATIENT)
Dept: MAMMOGRAPHY | Facility: CLINIC | Age: 64
End: 2024-10-24
Attending: FAMILY MEDICINE
Payer: COMMERCIAL

## 2024-10-24 DIAGNOSIS — Z12.31 VISIT FOR SCREENING MAMMOGRAM: ICD-10-CM

## 2024-10-24 PROCEDURE — 77067 SCR MAMMO BI INCL CAD: CPT | Mod: TC | Performed by: STUDENT IN AN ORGANIZED HEALTH CARE EDUCATION/TRAINING PROGRAM

## 2024-10-24 PROCEDURE — 77063 BREAST TOMOSYNTHESIS BI: CPT | Mod: TC | Performed by: STUDENT IN AN ORGANIZED HEALTH CARE EDUCATION/TRAINING PROGRAM

## 2024-10-24 NOTE — TELEPHONE ENCOUNTER
----- Message from Raómn Kruger sent at 10/24/2024  3:42 PM CDT -----  Kidney function is a little worse than a month ago.  One option to have her see a kidney doctor or we can repeat another test in 4 weeks before referral.  Please call the patient and ask her which she prefers.    Thank you,    Dr. Ramón Kruger  10/24/2024 3:42 PM

## 2024-10-25 DIAGNOSIS — R79.89 ABNORMAL BLOOD CREATININE LEVEL: Primary | ICD-10-CM

## 2024-10-25 NOTE — TELEPHONE ENCOUNTER
Called patient and relayed message. Patient verbalized understanding and stated she would like to try another lab test. Lab only appt scheduled 11/22.     Dr. Kruger, please place lab order. Thank you.      Omar Chavez, BSN RN  [facility]

## 2024-11-09 DIAGNOSIS — E03.9 HYPOTHYROIDISM, UNSPECIFIED TYPE: ICD-10-CM

## 2024-11-10 ENCOUNTER — MYC REFILL (OUTPATIENT)
Dept: FAMILY MEDICINE | Facility: CLINIC | Age: 64
End: 2024-11-10
Payer: COMMERCIAL

## 2024-11-10 DIAGNOSIS — E03.9 HYPOTHYROIDISM, UNSPECIFIED TYPE: ICD-10-CM

## 2024-11-10 RX ORDER — LEVOTHYROXINE SODIUM 88 UG/1
88 TABLET ORAL DAILY
Qty: 90 TABLET | Refills: 0 | OUTPATIENT
Start: 2024-11-10

## 2024-11-10 RX ORDER — LEVOTHYROXINE SODIUM 88 UG/1
88 TABLET ORAL DAILY
Qty: 90 TABLET | Refills: 2 | Status: SHIPPED | OUTPATIENT
Start: 2024-11-10

## 2024-11-26 ENCOUNTER — TELEPHONE (OUTPATIENT)
Dept: FAMILY MEDICINE | Facility: CLINIC | Age: 64
End: 2024-11-26
Payer: COMMERCIAL

## 2024-11-26 NOTE — TELEPHONE ENCOUNTER
----- Message from Ramón Kruger sent at 11/25/2024  7:40 AM CST -----  Kidney function is improving but still abnormal. We will recheck when she comes in for follow up.   Continue to increase daily water intake.  Please call the patient.     Thank you,    Dr. Ramón Kruger  11/25/2024 7:40 AM

## 2024-11-26 NOTE — TELEPHONE ENCOUNTER
Patient return call, relayed provider's message. Pt verbalized understanding and no further questions.

## 2024-11-26 NOTE — TELEPHONE ENCOUNTER
Writer attempt #1 to call patient regarding clinician's message below. No answer, left non-detailed voicemail, with clinic call back number.     If patient calls back, please relay clinician's message to them. Thanks.    FARHAT BreenN, RN, PHN   [facility]

## 2025-03-06 ENCOUNTER — TELEPHONE (OUTPATIENT)
Dept: FAMILY MEDICINE | Facility: CLINIC | Age: 65
End: 2025-03-06
Payer: COMMERCIAL

## 2025-03-06 NOTE — TELEPHONE ENCOUNTER
Patient Quality Outreach    Patient is due for the following:   PHQ-2    Action(s) Taken:   Patient has upcoming appointment, these items will be addressed at that time.    Type of outreach:    Phone, spoke to patient/parent. Patient/parent will call back to schedule.    Questions for provider review:    None           Philipp Nam MA  Chart routed to Care Team.

## 2025-03-10 ENCOUNTER — OFFICE VISIT (OUTPATIENT)
Dept: FAMILY MEDICINE | Facility: CLINIC | Age: 65
End: 2025-03-10
Payer: COMMERCIAL

## 2025-03-10 VITALS
TEMPERATURE: 97.4 F | OXYGEN SATURATION: 95 % | BODY MASS INDEX: 43.54 KG/M2 | HEART RATE: 115 BPM | SYSTOLIC BLOOD PRESSURE: 152 MMHG | WEIGHT: 277.44 LBS | RESPIRATION RATE: 16 BRPM | DIASTOLIC BLOOD PRESSURE: 92 MMHG | HEIGHT: 67 IN

## 2025-03-10 DIAGNOSIS — E03.9 HYPOTHYROIDISM, UNSPECIFIED TYPE: ICD-10-CM

## 2025-03-10 DIAGNOSIS — E78.2 MIXED HYPERLIPIDEMIA: ICD-10-CM

## 2025-03-10 DIAGNOSIS — N18.9 CHRONIC KIDNEY DISEASE, UNSPECIFIED CKD STAGE: ICD-10-CM

## 2025-03-10 DIAGNOSIS — I10 ESSENTIAL HYPERTENSION: Primary | ICD-10-CM

## 2025-03-10 LAB
ANION GAP SERPL CALCULATED.3IONS-SCNC: 14 MMOL/L (ref 7–15)
BUN SERPL-MCNC: 19.3 MG/DL (ref 8–23)
CALCIUM SERPL-MCNC: 10.1 MG/DL (ref 8.8–10.4)
CHLORIDE SERPL-SCNC: 107 MMOL/L (ref 98–107)
CHOLEST SERPL-MCNC: 216 MG/DL
CREAT SERPL-MCNC: 1.16 MG/DL (ref 0.51–0.95)
EGFRCR SERPLBLD CKD-EPI 2021: 52 ML/MIN/1.73M2
FASTING STATUS PATIENT QL REPORTED: NO
FASTING STATUS PATIENT QL REPORTED: NO
GLUCOSE SERPL-MCNC: 96 MG/DL (ref 70–99)
HCO3 SERPL-SCNC: 21 MMOL/L (ref 22–29)
HDLC SERPL-MCNC: 45 MG/DL
LDLC SERPL CALC-MCNC: 140 MG/DL
NONHDLC SERPL-MCNC: 171 MG/DL
POTASSIUM SERPL-SCNC: 4.1 MMOL/L (ref 3.4–5.3)
SODIUM SERPL-SCNC: 142 MMOL/L (ref 135–145)
TRIGL SERPL-MCNC: 155 MG/DL
TSH SERPL DL<=0.005 MIU/L-ACNC: 1.86 UIU/ML (ref 0.3–4.2)

## 2025-03-10 PROCEDURE — 80048 BASIC METABOLIC PNL TOTAL CA: CPT | Performed by: FAMILY MEDICINE

## 2025-03-10 PROCEDURE — 99214 OFFICE O/P EST MOD 30 MIN: CPT | Performed by: FAMILY MEDICINE

## 2025-03-10 PROCEDURE — 3077F SYST BP >= 140 MM HG: CPT | Performed by: FAMILY MEDICINE

## 2025-03-10 PROCEDURE — 80061 LIPID PANEL: CPT | Performed by: FAMILY MEDICINE

## 2025-03-10 PROCEDURE — G2211 COMPLEX E/M VISIT ADD ON: HCPCS | Performed by: FAMILY MEDICINE

## 2025-03-10 PROCEDURE — 3080F DIAST BP >= 90 MM HG: CPT | Performed by: FAMILY MEDICINE

## 2025-03-10 PROCEDURE — 84443 ASSAY THYROID STIM HORMONE: CPT | Performed by: FAMILY MEDICINE

## 2025-03-10 PROCEDURE — 36415 COLL VENOUS BLD VENIPUNCTURE: CPT | Performed by: FAMILY MEDICINE

## 2025-03-10 NOTE — PROGRESS NOTES
Essential hypertension  Recorded home blood pressure readings are within normal range.  She will continue to monitor blood pressure daily at home and send [employer] message in 2 weeks.  RN visit in 2 weeks for BP check.  Will increase lisinopril to 20 mg if indicated.  - Basic metabolic panel    Mixed hyperlipidemia  Start medication if indicated.  - Lipid panel    Hypothyroidism, unspecified type  Change medication dose if indicated.  - TSH with free T4 reflex    Chronic kidney disease, unspecified CKD stage  Baseline creatinine 1.05- 1.25 .  - Basic metabolic panel     Hermes BARRIOS is a pleasant 64 year old with history of hypertension and hypothyroidism here for follow-up.  She is currently on lisinopril 15 mg daily.  She monitors blood pressure at home occasionally and the numbers are usually in systolic 110-120s and diastolic 70s- 80s most days.  Denied frequent headaches.  Denied vision changes.  Denied weakness.    Currently on levothyroxine 88 mcg daily.  Last TSH was normal (1.89 ) 6 months ago.        3/10/2025     9:45 AM   Additional Questions   Roomed by Marsha ENGEL   Accompanied by self     History of Present Illness       Hypertension: She presents for follow up of hypertension.  She does not check blood pressure  regularly outside of the clinic. Outpatient blood pressures have not been over 140/90. She follows a low salt diet.     She eats 2-3 servings of fruits and vegetables daily.She consumes 0 sweetened beverage(s) daily.She exercises with enough effort to increase her heart rate 20 to 29 minutes per day.  She exercises with enough effort to increase her heart rate 4 days per week.   She is taking medications regularly.          Review of Systems  CONSTITUTIONAL: NEGATIVE for fever, chills, change in weight  ENT/MOUTH: NEGATIVE for ear, mouth and throat problems  RESP: NEGATIVE for significant cough or SOB  CV: NEGATIVE for chest pain/chest pressure      Objective    Vitals:    03/10/25 0947  "03/10/25 1235   BP: (!) 162/111 (!) 152/92   Pulse: 115    Resp: 16    Temp: 97.4  F (36.3  C)    TempSrc: Oral    SpO2: 95%    Weight: 125.8 kg (277 lb 7 oz)    Height: 1.7 m (5' 6.93\")       Physical Exam   GENERAL: alert and no distress  NECK: Supple  RESP: lungs clear to auscultation - no rales, rhonchi or wheezes  CV: regular rate and rhythm, normal S1 S2  ABDOMEN: soft, nontender  NEURO: No focal findings  PSYCH: mentation appears normal, affect normal/bright    This transcription uses voice recognition software, which may contain typographical errors.    The longitudinal plan of care for the diagnosis(es)/condition(s) as documented were addressed during this visit. Due to the added complexity in care, I will continue to support DENIS in the subsequent management and with ongoing continuity of care.          Signed Electronically by: Ramón Kruger MD    "

## 2025-03-24 ENCOUNTER — ALLIED HEALTH/NURSE VISIT (OUTPATIENT)
Dept: FAMILY MEDICINE | Facility: CLINIC | Age: 65
End: 2025-03-24
Payer: COMMERCIAL

## 2025-03-24 VITALS — SYSTOLIC BLOOD PRESSURE: 144 MMHG | DIASTOLIC BLOOD PRESSURE: 84 MMHG | HEART RATE: 88 BPM

## 2025-03-24 DIAGNOSIS — I10 HYPERTENSION, UNSPECIFIED TYPE: Primary | ICD-10-CM

## 2025-03-24 PROCEDURE — 99207 PR NO CHARGE NURSE ONLY: CPT

## 2025-03-24 PROCEDURE — 3077F SYST BP >= 140 MM HG: CPT

## 2025-03-24 PROCEDURE — 3079F DIAST BP 80-89 MM HG: CPT

## 2025-03-24 NOTE — PROGRESS NOTES
I met with Tanisha Lea at the request of Dr. Kruger to recheck her blood pressure.  Blood pressure medications on the med list were reviewed with patient.    Patient has taken all medications as per usual regimen: Yes (patient takes BP medication in the evening)  Patient reports tolerating them without any issues or concerns: Yes    Vitals:    03/24/25 0909 03/24/25 0924   BP: (!) 165/80 (!) 144/84   BP Location: Left arm Left arm   Patient Position: Sitting Sitting   Cuff Size: Adult Large Adult Large   Pulse: 95 88     Home BP monitor (wrist monitor)--new monitor   RT wrist & sitting  Reading at 0914: 170/82, HR 93  Reading at 0925: 162/85, HR 88  Reading at 0929: 143/73, HR 87 (LT wrist)      After 5 minutes, the patient's blood pressure remained greater than or equal to 140/90.    Is the patient currently having any chest pain? No  Does the patient currently have a headache? No  Does the patient currently have any vision changes? No  Does the patient currently have any nausea? No  Does the patient currently have any abdominal pain? No    The previous encounter was reviewed.  The patient was discharged and the note will be sent to the provider for final review.  Patient takes BP medication in the evening, have not taken it today for appointment (last time taken was last evening 3/23).      Zhou Carr, MSN, RN   [facility]

## 2025-05-05 DIAGNOSIS — E03.9 HYPOTHYROIDISM, UNSPECIFIED TYPE: ICD-10-CM

## 2025-05-05 RX ORDER — LEVOTHYROXINE SODIUM 88 UG/1
88 TABLET ORAL DAILY
Qty: 90 TABLET | Refills: 2 | OUTPATIENT
Start: 2025-05-05

## 2025-08-19 ENCOUNTER — MYC REFILL (OUTPATIENT)
Dept: FAMILY MEDICINE | Facility: CLINIC | Age: 65
End: 2025-08-19
Payer: COMMERCIAL

## 2025-08-19 DIAGNOSIS — E03.9 HYPOTHYROIDISM, UNSPECIFIED TYPE: ICD-10-CM

## 2025-08-19 RX ORDER — LEVOTHYROXINE SODIUM 88 UG/1
88 TABLET ORAL DAILY
Qty: 90 TABLET | Refills: 1 | Status: SHIPPED | OUTPATIENT
Start: 2025-08-19

## 2025-08-22 DIAGNOSIS — I10 ESSENTIAL HYPERTENSION: ICD-10-CM

## 2025-08-24 RX ORDER — LISINOPRIL 5 MG/1
TABLET ORAL
Qty: 90 TABLET | Refills: 0 | Status: SHIPPED | OUTPATIENT
Start: 2025-08-24

## 2025-08-24 RX ORDER — LISINOPRIL 10 MG/1
10 TABLET ORAL DAILY
Qty: 90 TABLET | Refills: 0 | Status: SHIPPED | OUTPATIENT
Start: 2025-08-24